# Patient Record
Sex: FEMALE | Race: WHITE | Employment: FULL TIME | URBAN - METROPOLITAN AREA
[De-identification: names, ages, dates, MRNs, and addresses within clinical notes are randomized per-mention and may not be internally consistent; named-entity substitution may affect disease eponyms.]

---

## 2017-11-14 ENCOUNTER — APPOINTMENT (OUTPATIENT)
Dept: CT IMAGING | Age: 47
DRG: 415 | End: 2017-11-14
Attending: EMERGENCY MEDICINE
Payer: SELF-PAY

## 2017-11-14 ENCOUNTER — ANESTHESIA EVENT (OUTPATIENT)
Dept: SURGERY | Age: 47
DRG: 415 | End: 2017-11-14
Payer: SELF-PAY

## 2017-11-14 ENCOUNTER — APPOINTMENT (OUTPATIENT)
Dept: ULTRASOUND IMAGING | Age: 47
DRG: 415 | End: 2017-11-14
Attending: EMERGENCY MEDICINE
Payer: SELF-PAY

## 2017-11-14 ENCOUNTER — HOSPITAL ENCOUNTER (INPATIENT)
Age: 47
LOS: 4 days | Discharge: HOME | DRG: 415 | End: 2017-11-19
Attending: EMERGENCY MEDICINE | Admitting: SURGERY
Payer: SELF-PAY

## 2017-11-14 ENCOUNTER — ANESTHESIA (OUTPATIENT)
Dept: SURGERY | Age: 47
DRG: 415 | End: 2017-11-14
Payer: SELF-PAY

## 2017-11-14 DIAGNOSIS — K81.0 ACUTE CHOLECYSTITIS: Primary | ICD-10-CM

## 2017-11-14 PROBLEM — K80.00 ACUTE CALCULOUS CHOLECYSTITIS: Status: ACTIVE | Noted: 2017-11-14

## 2017-11-14 LAB
ALBUMIN SERPL-MCNC: 3.8 G/DL (ref 3.5–5)
ALBUMIN/GLOB SERPL: 1 {RATIO} (ref 1.1–2.2)
ALP SERPL-CCNC: 94 U/L (ref 45–117)
ALT SERPL-CCNC: 26 U/L (ref 12–78)
ANION GAP SERPL CALC-SCNC: 9 MMOL/L (ref 5–15)
APPEARANCE UR: CLEAR
AST SERPL-CCNC: 11 U/L (ref 15–37)
BACTERIA URNS QL MICRO: NEGATIVE /HPF
BASOPHILS # BLD: 0 K/UL (ref 0–0.1)
BASOPHILS NFR BLD: 0 % (ref 0–1)
BILIRUB SERPL-MCNC: 0.3 MG/DL (ref 0.2–1)
BILIRUB UR QL: NEGATIVE
BUN SERPL-MCNC: 8 MG/DL (ref 6–20)
BUN/CREAT SERPL: 11 (ref 12–20)
CALCIUM SERPL-MCNC: 8.9 MG/DL (ref 8.5–10.1)
CHLORIDE SERPL-SCNC: 104 MMOL/L (ref 97–108)
CO2 SERPL-SCNC: 24 MMOL/L (ref 21–32)
COLOR UR: NORMAL
CREAT SERPL-MCNC: 0.7 MG/DL (ref 0.55–1.02)
EOSINOPHIL # BLD: 0.1 K/UL (ref 0–0.4)
EOSINOPHIL NFR BLD: 1 % (ref 0–7)
EPITH CASTS URNS QL MICRO: NORMAL /LPF
ERYTHROCYTE [DISTWIDTH] IN BLOOD BY AUTOMATED COUNT: 13.6 % (ref 11.5–14.5)
GLOBULIN SER CALC-MCNC: 4 G/DL (ref 2–4)
GLUCOSE SERPL-MCNC: 126 MG/DL (ref 65–100)
GLUCOSE UR STRIP.AUTO-MCNC: NEGATIVE MG/DL
HCT VFR BLD AUTO: 45.2 % (ref 35–47)
HGB BLD-MCNC: 15.5 G/DL (ref 11.5–16)
HGB UR QL STRIP: NEGATIVE
HYALINE CASTS URNS QL MICRO: NORMAL /LPF (ref 0–5)
KETONES UR QL STRIP.AUTO: NEGATIVE MG/DL
LEUKOCYTE ESTERASE UR QL STRIP.AUTO: NEGATIVE
LIPASE SERPL-CCNC: 101 U/L (ref 73–393)
LYMPHOCYTES # BLD: 1.6 K/UL (ref 0.8–3.5)
LYMPHOCYTES NFR BLD: 11 % (ref 12–49)
MCH RBC QN AUTO: 30.9 PG (ref 26–34)
MCHC RBC AUTO-ENTMCNC: 34.3 G/DL (ref 30–36.5)
MCV RBC AUTO: 90 FL (ref 80–99)
MONOCYTES # BLD: 0.8 K/UL (ref 0–1)
MONOCYTES NFR BLD: 6 % (ref 5–13)
NEUTS SEG # BLD: 11.3 K/UL (ref 1.8–8)
NEUTS SEG NFR BLD: 82 % (ref 32–75)
NITRITE UR QL STRIP.AUTO: NEGATIVE
PH UR STRIP: 6 [PH] (ref 5–8)
PLATELET # BLD AUTO: 221 K/UL (ref 150–400)
POTASSIUM SERPL-SCNC: 4 MMOL/L (ref 3.5–5.1)
PROT SERPL-MCNC: 7.8 G/DL (ref 6.4–8.2)
PROT UR STRIP-MCNC: NEGATIVE MG/DL
RBC # BLD AUTO: 5.02 M/UL (ref 3.8–5.2)
RBC #/AREA URNS HPF: NORMAL /HPF (ref 0–5)
SODIUM SERPL-SCNC: 137 MMOL/L (ref 136–145)
SP GR UR REFRACTOMETRY: 1.01 (ref 1–1.03)
UA: UC IF INDICATED,UAUC: NORMAL
UROBILINOGEN UR QL STRIP.AUTO: 0.2 EU/DL (ref 0.2–1)
WBC # BLD AUTO: 13.9 K/UL (ref 3.6–11)
WBC URNS QL MICRO: NORMAL /HPF (ref 0–4)

## 2017-11-14 PROCEDURE — 77030011640 HC PAD GRND REM COVD -A: Performed by: SURGERY

## 2017-11-14 PROCEDURE — 86900 BLOOD TYPING SEROLOGIC ABO: CPT | Performed by: SURGERY

## 2017-11-14 PROCEDURE — 77030008756 HC TU IRR SUC STRY -B: Performed by: SURGERY

## 2017-11-14 PROCEDURE — 30243N1 TRANSFUSION OF NONAUTOLOGOUS RED BLOOD CELLS INTO CENTRAL VEIN, PERCUTANEOUS APPROACH: ICD-10-PCS | Performed by: SURGERY

## 2017-11-14 PROCEDURE — 76210000006 HC OR PH I REC 0.5 TO 1 HR: Performed by: SURGERY

## 2017-11-14 PROCEDURE — 77030035048 HC TRCR ENDOSC OPTCL COVD -B: Performed by: SURGERY

## 2017-11-14 PROCEDURE — 85025 COMPLETE CBC W/AUTO DIFF WBC: CPT | Performed by: EMERGENCY MEDICINE

## 2017-11-14 PROCEDURE — 77030020153 HC PRB DOPLR DISP MIZU -C: Performed by: SURGERY

## 2017-11-14 PROCEDURE — 76060000040 HC ANESTHESIA 4.5 TO 5 HR: Performed by: SURGERY

## 2017-11-14 PROCEDURE — 77030034698 HC LIGASURE MRYLND OPN SEAL DIV COVD -F: Performed by: SURGERY

## 2017-11-14 PROCEDURE — 77030018702 HC CLP LIG TI TELE -A: Performed by: SURGERY

## 2017-11-14 PROCEDURE — 77030002986 HC SUT PROL J&J -A: Performed by: SURGERY

## 2017-11-14 PROCEDURE — 77030009852 HC PCH RTVR ENDOSC COVD -B: Performed by: SURGERY

## 2017-11-14 PROCEDURE — 86920 COMPATIBILITY TEST SPIN: CPT | Performed by: SURGERY

## 2017-11-14 PROCEDURE — 77030002996 HC SUT SLK J&J -A: Performed by: SURGERY

## 2017-11-14 PROCEDURE — 77030010516 HC APPL HEMA CLP TELE -B: Performed by: SURGERY

## 2017-11-14 PROCEDURE — 77030008684 HC TU ET CUF COVD -B: Performed by: ANESTHESIOLOGY

## 2017-11-14 PROCEDURE — 77030035051: Performed by: SURGERY

## 2017-11-14 PROCEDURE — 74011636320 HC RX REV CODE- 636/320: Performed by: EMERGENCY MEDICINE

## 2017-11-14 PROCEDURE — 74011000258 HC RX REV CODE- 258

## 2017-11-14 PROCEDURE — P9045 ALBUMIN (HUMAN), 5%, 250 ML: HCPCS

## 2017-11-14 PROCEDURE — 76010000136 HC OR TIME 4.5 TO 5 HR: Performed by: SURGERY

## 2017-11-14 PROCEDURE — 83690 ASSAY OF LIPASE: CPT | Performed by: EMERGENCY MEDICINE

## 2017-11-14 PROCEDURE — 77030015933 HC FIBRIJET DUPLO BAXT -B: Performed by: SURGERY

## 2017-11-14 PROCEDURE — 74011250636 HC RX REV CODE- 250/636: Performed by: SURGERY

## 2017-11-14 PROCEDURE — 74011250636 HC RX REV CODE- 250/636: Performed by: EMERGENCY MEDICINE

## 2017-11-14 PROCEDURE — 77030019908 HC STETH ESOPH SIMS -A: Performed by: ANESTHESIOLOGY

## 2017-11-14 PROCEDURE — 76705 ECHO EXAM OF ABDOMEN: CPT

## 2017-11-14 PROCEDURE — 96366 THER/PROPH/DIAG IV INF ADDON: CPT

## 2017-11-14 PROCEDURE — 77030031139 HC SUT VCRL2 J&J -A: Performed by: SURGERY

## 2017-11-14 PROCEDURE — 77030002933 HC SUT MCRYL J&J -A: Performed by: SURGERY

## 2017-11-14 PROCEDURE — 77030013533 HC SEAL FBRN TISSL RDYTUSE 10ML BAXT -E: Performed by: SURGERY

## 2017-11-14 PROCEDURE — 74011250636 HC RX REV CODE- 250/636

## 2017-11-14 PROCEDURE — 0FT40ZZ RESECTION OF GALLBLADDER, OPEN APPROACH: ICD-10-PCS | Performed by: SURGERY

## 2017-11-14 PROCEDURE — 77030014650 HC SEAL MTRX FLOSEL BAXT -C: Performed by: SURGERY

## 2017-11-14 PROCEDURE — 99284 EMERGENCY DEPT VISIT MOD MDM: CPT

## 2017-11-14 PROCEDURE — 96375 TX/PRO/DX INJ NEW DRUG ADDON: CPT

## 2017-11-14 PROCEDURE — 77030011266 HC ELECTRD BLD INSL COVD -A: Performed by: SURGERY

## 2017-11-14 PROCEDURE — 77030020053 HC ELECTRD LAPSCP COVD -B: Performed by: SURGERY

## 2017-11-14 PROCEDURE — 77030035220 HC TRCR ENDOSC BLNTPRT ANCHR COVD -B: Performed by: SURGERY

## 2017-11-14 PROCEDURE — 96361 HYDRATE IV INFUSION ADD-ON: CPT

## 2017-11-14 PROCEDURE — 96376 TX/PRO/DX INJ SAME DRUG ADON: CPT

## 2017-11-14 PROCEDURE — 77030008771 HC TU NG SALEM SUMP -A: Performed by: ANESTHESIOLOGY

## 2017-11-14 PROCEDURE — 36415 COLL VENOUS BLD VENIPUNCTURE: CPT | Performed by: EMERGENCY MEDICINE

## 2017-11-14 PROCEDURE — 77030010507 HC ADH SKN DERMBND J&J -B: Performed by: SURGERY

## 2017-11-14 PROCEDURE — 77030008467 HC STPLR SKN COVD -B: Performed by: SURGERY

## 2017-11-14 PROCEDURE — 77030018836 HC SOL IRR NACL ICUM -A: Performed by: SURGERY

## 2017-11-14 PROCEDURE — 0F944ZZ DRAINAGE OF GALLBLADDER, PERCUTANEOUS ENDOSCOPIC APPROACH: ICD-10-PCS | Performed by: SURGERY

## 2017-11-14 PROCEDURE — 77030026438 HC STYL ET INTUB CARD -A: Performed by: ANESTHESIOLOGY

## 2017-11-14 PROCEDURE — 80053 COMPREHEN METABOLIC PANEL: CPT | Performed by: EMERGENCY MEDICINE

## 2017-11-14 PROCEDURE — 77030032490 HC SLV COMPR SCD KNE COVD -B: Performed by: SURGERY

## 2017-11-14 PROCEDURE — 74177 CT ABD & PELVIS W/CONTRAST: CPT

## 2017-11-14 PROCEDURE — 74011000250 HC RX REV CODE- 250

## 2017-11-14 PROCEDURE — 77030002966 HC SUT PDS J&J -A: Performed by: SURGERY

## 2017-11-14 PROCEDURE — 96365 THER/PROPH/DIAG IV INF INIT: CPT

## 2017-11-14 PROCEDURE — 04QY0ZZ REPAIR LOWER ARTERY, OPEN APPROACH: ICD-10-PCS | Performed by: SURGERY

## 2017-11-14 PROCEDURE — 81001 URINALYSIS AUTO W/SCOPE: CPT | Performed by: EMERGENCY MEDICINE

## 2017-11-14 PROCEDURE — 77030018390 HC SPNG HEMSTAT2 J&J -B: Performed by: SURGERY

## 2017-11-14 PROCEDURE — 85018 HEMOGLOBIN: CPT

## 2017-11-14 PROCEDURE — 74011000258 HC RX REV CODE- 258: Performed by: SURGERY

## 2017-11-14 PROCEDURE — 77030012022 HC APPL CLP ENDOSC COVD -C: Performed by: SURGERY

## 2017-11-14 PROCEDURE — P9016 RBC LEUKOCYTES REDUCED: HCPCS | Performed by: SURGERY

## 2017-11-14 RX ORDER — LIDOCAINE HYDROCHLORIDE 20 MG/ML
INJECTION, SOLUTION EPIDURAL; INFILTRATION; INTRACAUDAL; PERINEURAL AS NEEDED
Status: DISCONTINUED | OUTPATIENT
Start: 2017-11-14 | End: 2017-11-15 | Stop reason: HOSPADM

## 2017-11-14 RX ORDER — SODIUM CHLORIDE, SODIUM LACTATE, POTASSIUM CHLORIDE, CALCIUM CHLORIDE 600; 310; 30; 20 MG/100ML; MG/100ML; MG/100ML; MG/100ML
100 INJECTION, SOLUTION INTRAVENOUS CONTINUOUS
Status: DISCONTINUED | OUTPATIENT
Start: 2017-11-14 | End: 2017-11-15

## 2017-11-14 RX ORDER — MIDAZOLAM HYDROCHLORIDE 1 MG/ML
INJECTION, SOLUTION INTRAMUSCULAR; INTRAVENOUS AS NEEDED
Status: DISCONTINUED | OUTPATIENT
Start: 2017-11-14 | End: 2017-11-15 | Stop reason: HOSPADM

## 2017-11-14 RX ORDER — HYDROMORPHONE HYDROCHLORIDE 1 MG/ML
1 INJECTION, SOLUTION INTRAMUSCULAR; INTRAVENOUS; SUBCUTANEOUS
Status: COMPLETED | OUTPATIENT
Start: 2017-11-14 | End: 2017-11-14

## 2017-11-14 RX ORDER — SODIUM CHLORIDE 9 MG/ML
125 INJECTION, SOLUTION INTRAVENOUS CONTINUOUS
Status: DISCONTINUED | OUTPATIENT
Start: 2017-11-14 | End: 2017-11-15

## 2017-11-14 RX ORDER — SODIUM CHLORIDE 9 MG/ML
INJECTION, SOLUTION INTRAVENOUS
Status: DISCONTINUED | OUTPATIENT
Start: 2017-11-14 | End: 2017-11-15 | Stop reason: HOSPADM

## 2017-11-14 RX ORDER — ROCURONIUM BROMIDE 10 MG/ML
INJECTION, SOLUTION INTRAVENOUS AS NEEDED
Status: DISCONTINUED | OUTPATIENT
Start: 2017-11-14 | End: 2017-11-15 | Stop reason: HOSPADM

## 2017-11-14 RX ORDER — DEXAMETHASONE SODIUM PHOSPHATE 4 MG/ML
INJECTION, SOLUTION INTRA-ARTICULAR; INTRALESIONAL; INTRAMUSCULAR; INTRAVENOUS; SOFT TISSUE AS NEEDED
Status: DISCONTINUED | OUTPATIENT
Start: 2017-11-14 | End: 2017-11-15 | Stop reason: HOSPADM

## 2017-11-14 RX ORDER — SODIUM CHLORIDE 9 MG/ML
250 INJECTION, SOLUTION INTRAVENOUS AS NEEDED
Status: DISCONTINUED | OUTPATIENT
Start: 2017-11-14 | End: 2017-11-15 | Stop reason: ALTCHOICE

## 2017-11-14 RX ORDER — LIDOCAINE HYDROCHLORIDE 10 MG/ML
0.1 INJECTION, SOLUTION EPIDURAL; INFILTRATION; INTRACAUDAL; PERINEURAL AS NEEDED
Status: DISCONTINUED | OUTPATIENT
Start: 2017-11-14 | End: 2017-11-15

## 2017-11-14 RX ORDER — SODIUM CHLORIDE 9 MG/ML
50 INJECTION, SOLUTION INTRAVENOUS
Status: COMPLETED | OUTPATIENT
Start: 2017-11-14 | End: 2017-11-17

## 2017-11-14 RX ORDER — MORPHINE SULFATE 2 MG/ML
INJECTION, SOLUTION INTRAMUSCULAR; INTRAVENOUS
Status: DISPENSED
Start: 2017-11-14 | End: 2017-11-14

## 2017-11-14 RX ORDER — SODIUM CHLORIDE, SODIUM LACTATE, POTASSIUM CHLORIDE, CALCIUM CHLORIDE 600; 310; 30; 20 MG/100ML; MG/100ML; MG/100ML; MG/100ML
INJECTION, SOLUTION INTRAVENOUS
Status: DISCONTINUED | OUTPATIENT
Start: 2017-11-14 | End: 2017-11-15 | Stop reason: HOSPADM

## 2017-11-14 RX ORDER — ACETAMINOPHEN 10 MG/ML
INJECTION, SOLUTION INTRAVENOUS AS NEEDED
Status: DISCONTINUED | OUTPATIENT
Start: 2017-11-14 | End: 2017-11-15 | Stop reason: HOSPADM

## 2017-11-14 RX ORDER — ONDANSETRON 2 MG/ML
4 INJECTION INTRAMUSCULAR; INTRAVENOUS
Status: COMPLETED | OUTPATIENT
Start: 2017-11-14 | End: 2017-11-14

## 2017-11-14 RX ORDER — MORPHINE SULFATE 2 MG/ML
4 INJECTION, SOLUTION INTRAMUSCULAR; INTRAVENOUS
Status: COMPLETED | OUTPATIENT
Start: 2017-11-14 | End: 2017-11-14

## 2017-11-14 RX ORDER — MORPHINE SULFATE 2 MG/ML
6 INJECTION, SOLUTION INTRAMUSCULAR; INTRAVENOUS
Status: COMPLETED | OUTPATIENT
Start: 2017-11-14 | End: 2017-11-14

## 2017-11-14 RX ORDER — SUCCINYLCHOLINE CHLORIDE 20 MG/ML
INJECTION INTRAMUSCULAR; INTRAVENOUS AS NEEDED
Status: DISCONTINUED | OUTPATIENT
Start: 2017-11-14 | End: 2017-11-15 | Stop reason: HOSPADM

## 2017-11-14 RX ORDER — MIDAZOLAM HYDROCHLORIDE 1 MG/ML
1 INJECTION, SOLUTION INTRAMUSCULAR; INTRAVENOUS AS NEEDED
Status: DISCONTINUED | OUTPATIENT
Start: 2017-11-14 | End: 2017-11-15

## 2017-11-14 RX ORDER — BISMUTH SUBSALICYLATE 262 MG/15ML
LIQUID ORAL
COMMUNITY
End: 2021-07-20

## 2017-11-14 RX ORDER — LITHIUM CARBONATE 600 MG/1
600 CAPSULE ORAL
COMMUNITY

## 2017-11-14 RX ORDER — FENTANYL CITRATE 50 UG/ML
50 INJECTION, SOLUTION INTRAMUSCULAR; INTRAVENOUS AS NEEDED
Status: DISCONTINUED | OUTPATIENT
Start: 2017-11-14 | End: 2017-11-15

## 2017-11-14 RX ORDER — PROPOFOL 10 MG/ML
INJECTION, EMULSION INTRAVENOUS AS NEEDED
Status: DISCONTINUED | OUTPATIENT
Start: 2017-11-14 | End: 2017-11-15 | Stop reason: HOSPADM

## 2017-11-14 RX ORDER — SODIUM CHLORIDE 0.9 % (FLUSH) 0.9 %
10 SYRINGE (ML) INJECTION
Status: COMPLETED | OUTPATIENT
Start: 2017-11-14 | End: 2017-11-14

## 2017-11-14 RX ORDER — FENTANYL CITRATE 50 UG/ML
INJECTION, SOLUTION INTRAMUSCULAR; INTRAVENOUS AS NEEDED
Status: DISCONTINUED | OUTPATIENT
Start: 2017-11-14 | End: 2017-11-15 | Stop reason: HOSPADM

## 2017-11-14 RX ORDER — PHENYLEPHRINE HCL IN 0.9% NACL 0.4MG/10ML
SYRINGE (ML) INTRAVENOUS AS NEEDED
Status: DISCONTINUED | OUTPATIENT
Start: 2017-11-14 | End: 2017-11-15 | Stop reason: HOSPADM

## 2017-11-14 RX ORDER — ALBUMIN HUMAN 50 G/1000ML
SOLUTION INTRAVENOUS AS NEEDED
Status: DISCONTINUED | OUTPATIENT
Start: 2017-11-14 | End: 2017-11-15 | Stop reason: HOSPADM

## 2017-11-14 RX ADMIN — SODIUM CHLORIDE, SODIUM LACTATE, POTASSIUM CHLORIDE, CALCIUM CHLORIDE: 600; 310; 30; 20 INJECTION, SOLUTION INTRAVENOUS at 21:00

## 2017-11-14 RX ADMIN — Medication 120 MCG: at 21:21

## 2017-11-14 RX ADMIN — MORPHINE SULFATE 6 MG: 2 INJECTION, SOLUTION INTRAMUSCULAR; INTRAVENOUS at 08:57

## 2017-11-14 RX ADMIN — Medication 80 MCG: at 21:18

## 2017-11-14 RX ADMIN — ALBUMIN HUMAN 250 ML: 50 SOLUTION INTRAVENOUS at 21:53

## 2017-11-14 RX ADMIN — MIDAZOLAM HYDROCHLORIDE 2 MG: 1 INJECTION, SOLUTION INTRAMUSCULAR; INTRAVENOUS at 20:28

## 2017-11-14 RX ADMIN — HYDROMORPHONE HYDROCHLORIDE 1 MG: 1 INJECTION, SOLUTION INTRAMUSCULAR; INTRAVENOUS; SUBCUTANEOUS at 18:50

## 2017-11-14 RX ADMIN — HYDROMORPHONE HYDROCHLORIDE 1 MG: 1 INJECTION, SOLUTION INTRAMUSCULAR; INTRAVENOUS; SUBCUTANEOUS at 13:58

## 2017-11-14 RX ADMIN — Medication 80 MCG: at 20:51

## 2017-11-14 RX ADMIN — SODIUM CHLORIDE 50 ML/HR: 900 INJECTION, SOLUTION INTRAVENOUS at 09:26

## 2017-11-14 RX ADMIN — SODIUM CHLORIDE, SODIUM LACTATE, POTASSIUM CHLORIDE, CALCIUM CHLORIDE: 600; 310; 30; 20 INJECTION, SOLUTION INTRAVENOUS at 21:15

## 2017-11-14 RX ADMIN — HYDROMORPHONE HYDROCHLORIDE 1 MG: 1 INJECTION, SOLUTION INTRAMUSCULAR; INTRAVENOUS; SUBCUTANEOUS at 16:06

## 2017-11-14 RX ADMIN — MORPHINE SULFATE 4 MG: 2 INJECTION, SOLUTION INTRAMUSCULAR; INTRAVENOUS at 08:07

## 2017-11-14 RX ADMIN — HYDROMORPHONE HYDROCHLORIDE 1 MG: 1 INJECTION, SOLUTION INTRAMUSCULAR; INTRAVENOUS; SUBCUTANEOUS at 10:57

## 2017-11-14 RX ADMIN — SODIUM CHLORIDE, SODIUM LACTATE, POTASSIUM CHLORIDE, CALCIUM CHLORIDE: 600; 310; 30; 20 INJECTION, SOLUTION INTRAVENOUS at 22:55

## 2017-11-14 RX ADMIN — SODIUM CHLORIDE: 9 INJECTION, SOLUTION INTRAVENOUS at 23:15

## 2017-11-14 RX ADMIN — ALBUMIN HUMAN 250 ML: 50 SOLUTION INTRAVENOUS at 21:40

## 2017-11-14 RX ADMIN — ONDANSETRON 4 MG: 2 INJECTION INTRAMUSCULAR; INTRAVENOUS at 08:06

## 2017-11-14 RX ADMIN — FENTANYL CITRATE 150 MCG: 50 INJECTION, SOLUTION INTRAMUSCULAR; INTRAVENOUS at 20:35

## 2017-11-14 RX ADMIN — IOPAMIDOL 100 ML: 755 INJECTION, SOLUTION INTRAVENOUS at 09:26

## 2017-11-14 RX ADMIN — ROCURONIUM BROMIDE 10 MG: 10 INJECTION, SOLUTION INTRAVENOUS at 22:13

## 2017-11-14 RX ADMIN — SODIUM CHLORIDE 1000 ML: 900 INJECTION, SOLUTION INTRAVENOUS at 08:15

## 2017-11-14 RX ADMIN — Medication 40 MCG: at 20:47

## 2017-11-14 RX ADMIN — ALBUMIN HUMAN 250 ML: 50 SOLUTION INTRAVENOUS at 23:50

## 2017-11-14 RX ADMIN — SODIUM CHLORIDE: 9 INJECTION, SOLUTION INTRAVENOUS at 21:55

## 2017-11-14 RX ADMIN — PIPERACILLIN SODIUM,TAZOBACTAM SODIUM 3.38 G: 3; .375 INJECTION, POWDER, FOR SOLUTION INTRAVENOUS at 23:00

## 2017-11-14 RX ADMIN — ALBUMIN HUMAN 250 ML: 50 SOLUTION INTRAVENOUS at 21:25

## 2017-11-14 RX ADMIN — PROPOFOL 150 MG: 10 INJECTION, EMULSION INTRAVENOUS at 20:35

## 2017-11-14 RX ADMIN — ROCURONIUM BROMIDE 10 MG: 10 INJECTION, SOLUTION INTRAVENOUS at 20:35

## 2017-11-14 RX ADMIN — LIDOCAINE HYDROCHLORIDE 80 MG: 20 INJECTION, SOLUTION EPIDURAL; INFILTRATION; INTRACAUDAL; PERINEURAL at 20:35

## 2017-11-14 RX ADMIN — Medication 10 ML: at 09:26

## 2017-11-14 RX ADMIN — ROCURONIUM BROMIDE 10 MG: 10 INJECTION, SOLUTION INTRAVENOUS at 21:15

## 2017-11-14 RX ADMIN — SODIUM CHLORIDE 125 ML/HR: 900 INJECTION, SOLUTION INTRAVENOUS at 14:09

## 2017-11-14 RX ADMIN — Medication 120 MCG: at 21:19

## 2017-11-14 RX ADMIN — SUCCINYLCHOLINE CHLORIDE 120 MG: 20 INJECTION INTRAMUSCULAR; INTRAVENOUS at 20:35

## 2017-11-14 RX ADMIN — ROCURONIUM BROMIDE 10 MG: 10 INJECTION, SOLUTION INTRAVENOUS at 23:30

## 2017-11-14 RX ADMIN — ACETAMINOPHEN 1000 MG: 10 INJECTION, SOLUTION INTRAVENOUS at 20:45

## 2017-11-14 RX ADMIN — SODIUM CHLORIDE, SODIUM LACTATE, POTASSIUM CHLORIDE, CALCIUM CHLORIDE: 600; 310; 30; 20 INJECTION, SOLUTION INTRAVENOUS at 20:28

## 2017-11-14 RX ADMIN — ROCURONIUM BROMIDE 20 MG: 10 INJECTION, SOLUTION INTRAVENOUS at 20:43

## 2017-11-14 RX ADMIN — PIPERACILLIN SODIUM,TAZOBACTAM SODIUM 3.38 G: 3; .375 INJECTION, POWDER, FOR SOLUTION INTRAVENOUS at 16:06

## 2017-11-14 RX ADMIN — DEXAMETHASONE SODIUM PHOSPHATE 10 MG: 4 INJECTION, SOLUTION INTRA-ARTICULAR; INTRALESIONAL; INTRAMUSCULAR; INTRAVENOUS; SOFT TISSUE at 20:45

## 2017-11-14 RX ADMIN — ROCURONIUM BROMIDE 10 MG: 10 INJECTION, SOLUTION INTRAVENOUS at 22:35

## 2017-11-14 NOTE — ED NOTES
Pt yelling out and rocking back and forth on stretcher in pain. Dr Jenna Nixon aware via scribe. Pt and family aware that this RN spoke with MD and awaiting medication orders.

## 2017-11-14 NOTE — H&P
Surgery History and Physcial    Subjective:      Sherren Craven is a 56 y.o. female who presents for evaluation of abdominal pain. The pain is located in the RUQ with radiation to the right back. Pain is described as sharp and stabbing and measures 9/10 in intensity. Onset of pain was 3 days ago. Aggravating factors include movement and eating. Alleviating factors include none. Associated symptoms include anorexia, nausea and vomiting. Patient Active Problem List    Diagnosis Date Noted    Acute calculous cholecystitis 11/14/2017    Female pelvic pain 11/13/2014    Dysmenorrhea 11/13/2014     Past Medical History:   Diagnosis Date    Psychiatric disorder     bipolar      Past Surgical History:   Procedure Laterality Date    HX GYN      hysteroscope    HX PARTIAL HYSTERECTOMY        Social History   Substance Use Topics    Smoking status: Current Every Day Smoker     Packs/day: 1.00     Years: 19.00    Smokeless tobacco: Never Used    Alcohol use Yes      Comment: socially      Family History   Problem Relation Age of Onset    Cancer Father      colon    Heart Disease Father      MI    Anesth Problems Neg Hx       Prior to Admission medications    Medication Sig Start Date End Date Taking? Authorizing Provider   lithium carbonate 600 mg capsule Take 600 mg by mouth daily. Yes Phys Jarrett, MD   dextroamphetamine-amphetamine (ADDERALL) 20 mg tablet Take 20 mg by mouth two (2) times a day. Yes Historical Provider   zolpidem (AMBIEN) 5 mg tablet Take  by mouth nightly as needed for Sleep. Yes Historical Provider   Cetirizine (ZYRTEC) 10 mg cap Take  by mouth nightly. Yes Historical Provider   lansoprazole (PREVACID) 30 mg capsule Take  by mouth nightly. Yes Historical Provider   rOPINIRole (REQUIP) 1 mg tablet Take 1 mg by mouth nightly.    Yes Historical Provider     Allergies   Allergen Reactions    Wellbutrin [Bupropion] Hives         Review of Systems   Constitutional: Positive for appetite change. Negative for chills, diaphoresis and fever. Respiratory: Negative for shortness of breath and wheezing. Cardiovascular: Negative for chest pain and palpitations. Gastrointestinal: Positive for abdominal pain, nausea and vomiting. Negative for diarrhea. Musculoskeletal: Positive for back pain. Negative for myalgias. Hematological: Does not bruise/bleed easily. Objective:     Visit Vitals    /64 (BP 1 Location: Left arm, BP Patient Position: At rest)    Temp 97.7 °F (36.5 °C)    Resp 16    Ht 5' 5\" (1.651 m)    Wt 155 lb 13.8 oz (70.7 kg)    SpO2 95%    BMI 25.94 kg/m2       Physical Exam   Constitutional: She appears well-developed and well-nourished. No distress. HENT:   Head: Normocephalic and atraumatic. Cardiovascular: Normal rate, regular rhythm, normal heart sounds and intact distal pulses. Pulmonary/Chest: Breath sounds normal. She has no wheezes. She has no rales. Abdominal: Soft. Bowel sounds are normal. She exhibits no distension and no mass. There is no hepatosplenomegaly. There is tenderness in the right upper quadrant. There is positive Pennington's sign. There is no rigidity, no rebound, no guarding and no tenderness at McBurney's point. No hernia. Musculoskeletal: Normal range of motion. Lymphadenopathy:     She has no cervical adenopathy.        Imaging:  images and reports reviewed    Lab Review:    Recent Results (from the past 24 hour(s))   METABOLIC PANEL, COMPREHENSIVE    Collection Time: 11/14/17  7:59 AM   Result Value Ref Range    Sodium 137 136 - 145 mmol/L    Potassium 4.0 3.5 - 5.1 mmol/L    Chloride 104 97 - 108 mmol/L    CO2 24 21 - 32 mmol/L    Anion gap 9 5 - 15 mmol/L    Glucose 126 (H) 65 - 100 mg/dL    BUN 8 6 - 20 MG/DL    Creatinine 0.70 0.55 - 1.02 MG/DL    BUN/Creatinine ratio 11 (L) 12 - 20      GFR est AA >60 >60 ml/min/1.73m2    GFR est non-AA >60 >60 ml/min/1.73m2    Calcium 8.9 8.5 - 10.1 MG/DL    Bilirubin, total 0.3 0.2 - 1.0 MG/DL    ALT (SGPT) 26 12 - 78 U/L    AST (SGOT) 11 (L) 15 - 37 U/L    Alk. phosphatase 94 45 - 117 U/L    Protein, total 7.8 6.4 - 8.2 g/dL    Albumin 3.8 3.5 - 5.0 g/dL    Globulin 4.0 2.0 - 4.0 g/dL    A-G Ratio 1.0 (L) 1.1 - 2.2     CBC WITH AUTOMATED DIFF    Collection Time: 11/14/17  7:59 AM   Result Value Ref Range    WBC 13.9 (H) 3.6 - 11.0 K/uL    RBC 5.02 3.80 - 5.20 M/uL    HGB 15.5 11.5 - 16.0 g/dL    HCT 45.2 35.0 - 47.0 %    MCV 90.0 80.0 - 99.0 FL    MCH 30.9 26.0 - 34.0 PG    MCHC 34.3 30.0 - 36.5 g/dL    RDW 13.6 11.5 - 14.5 %    PLATELET 670 688 - 958 K/uL    NEUTROPHILS 82 (H) 32 - 75 %    LYMPHOCYTES 11 (L) 12 - 49 %    MONOCYTES 6 5 - 13 %    EOSINOPHILS 1 0 - 7 %    BASOPHILS 0 0 - 1 %    ABS. NEUTROPHILS 11.3 (H) 1.8 - 8.0 K/UL    ABS. LYMPHOCYTES 1.6 0.8 - 3.5 K/UL    ABS. MONOCYTES 0.8 0.0 - 1.0 K/UL    ABS. EOSINOPHILS 0.1 0.0 - 0.4 K/UL    ABS. BASOPHILS 0.0 0.0 - 0.1 K/UL   LIPASE    Collection Time: 11/14/17  7:59 AM   Result Value Ref Range    Lipase 101 73 - 393 U/L   URINALYSIS W/ REFLEX CULTURE    Collection Time: 11/14/17  9:40 AM   Result Value Ref Range    Color YELLOW/STRAW      Appearance CLEAR CLEAR      Specific gravity 1.015 1.003 - 1.030      pH (UA) 6.0 5.0 - 8.0      Protein NEGATIVE  NEG mg/dL    Glucose NEGATIVE  NEG mg/dL    Ketone NEGATIVE  NEG mg/dL    Bilirubin NEGATIVE  NEG      Blood NEGATIVE  NEG      Urobilinogen 0.2 0.2 - 1.0 EU/dL    Nitrites NEGATIVE  NEG      Leukocyte Esterase NEGATIVE  NEG      WBC 0-4 0 - 4 /hpf    RBC 0-5 0 - 5 /hpf    Epithelial cells FEW FEW /lpf    Bacteria NEGATIVE  NEG /hpf    UA:UC IF INDICATED CULTURE NOT INDICATED BY UA RESULT CNI      Hyaline cast 0-2 0 - 5 /lpf         Assessment:     Abdominal pain, suspect acute calculous cholecystitis with impacted large stone in gallbladder neck, pericholecystic fluid and radiographic Pennington's sign. Plan:     1.  I recommend proceeding with Surgery:  Cholecystectomy and Laparoscopy. 2. Discussed aspects of surgical intervention, methods, risks including by not limited to infection, bleeding, hematoma, and perforation of the intestines or solid organs, conversion to open operation, injury to common bile duct, and the risks of general anesthetic. The patient understands the risks; any and all questions were answered to the patient's satisfaction.     Signed By: Clover Arteaga MD, [facility], [facility]    November 14, 2017

## 2017-11-14 NOTE — ED NOTES
Pt medicated as ordered with 1 mg IV Dilaudid. Pt updated on plan with pending evaluation by General Surgeon Pt immediately tearful stating \"I don't have insurance. \" Emotional support provided Pt family and friends remain bedside.

## 2017-11-14 NOTE — ED NOTES
Bedside shift change report given to Tuan Luis RN (oncoming nurse) by Joycelyn Paul RN (offgoing nurse). Report included the following information SBAR.

## 2017-11-14 NOTE — ED NOTES
Pt back from CT Pt ambulated to restroom to provide urine sample Pt back onto stretcher writhing back and forth complaining of pain. Pt asleep at time of transfer to CT department.      Dr Asiya Childs contacted via scribe to contact RN

## 2017-11-14 NOTE — PROGRESS NOTES
Piperacillin-tazobactam 3.375 gm changed to every 8 hours per extended infusion protocol.     LINCOLN FuentesD

## 2017-11-14 NOTE — ED NOTES
Pt medicated as ordered for pain/nausea. Pt  bedside Pt instructed on nice slow deep breathing to aid in her pain.

## 2017-11-14 NOTE — ED PROVIDER NOTES
[facility].  EMERGENCY DEPARTMENT HISTORY AND PHYSICAL EXAM       Date of Service: 11/14/2017   Patient Name: Robby Kelsey   YOB: 1970  Medical Record Number: [id number]    History of Presenting Illness     Chief Complaint   Patient presents with    Abdominal Pain     pt c/o epigastric pain that radiates to bilateral lower abdomen with vomiting since Friday.  Vomiting        History Provided By:  patient    Additional History:   Robby Kelsey is a 56 y.o. female with PMhx significant for bipolar disorder, partial hysterectomy who presents ambulatory to the ED with cc of epigastric abdominal tenderness that radiates downwards to her bilateral lower abdominal areas, present x 5 days. She denies any hx of similar episodes of abdominal pain. Pt reports associated sx of nausea, vomiting, chills, and diarrhea. Today, she reports waking up at 2AM with \"excrutiating\" pain in her epigastric abdominal area. She reports a partial hysterectomy 2 years ago and denies any other abdominal surgical hx. She denies alcohol use, ASA use, Ibuprofen use. She denies chance of pregnancy. Pt denies appetite changes and states she was able to tolerate PO food and liquids yesterday. She specifically denies malodorous urine, hematuria, blood in stool, frequency, dysuria, fever. Social Hx: + Tobacco, + EtOH, - Illicit Drugs    There are no other complaints, changes or physical findings at this time.     Primary Care Provider: Lakeisha Winston MD   Specialist: none    Past History     Past Medical History:   Past Medical History:   Diagnosis Date    Psychiatric disorder     bipolar        Past Surgical History:   Past Surgical History:   Procedure Laterality Date    HX GYN      hysteroscope    HX PARTIAL HYSTERECTOMY          Family History:   Family History   Problem Relation Age of Onset    Cancer Father      colon    Heart Disease Father      [facility] Anesth Problems Neg Hx         Social History:   Social History   Substance Use Topics    Smoking status: Current Every Day Smoker     Packs/day: 1.00     Years: 19.00    Smokeless tobacco: Never Used    Alcohol use Yes      Comment: socially        Allergies: Allergies   Allergen Reactions    Wellbutrin [Bupropion] Hives         Review of Systems   Review of Systems   Constitutional: Positive for chills. Negative for fatigue and fever. HENT: Negative. Eyes: Negative. Respiratory: Negative for shortness of breath and wheezing. Cardiovascular: Negative for chest pain and leg swelling. Gastrointestinal: Positive for abdominal pain, diarrhea, nausea and vomiting. Negative for blood in stool and constipation. Endocrine: Negative. Genitourinary: Negative for difficulty urinating, dysuria, frequency and hematuria. Musculoskeletal: Negative. Skin: Negative for rash. Allergic/Immunologic: Negative. Neurological: Negative for weakness and numbness. Hematological: Negative. Psychiatric/Behavioral: Negative. Physical Exam  Physical Exam   Constitutional: She is oriented to person, place, and time. She appears well-developed and well-nourished.   + Appears uncomfortable   HENT:   Head: Normocephalic and atraumatic. Mouth/Throat: Mucous membranes are normal.   Eyes: EOM are normal. Pupils are equal, round, and reactive to light. Neck: Normal range of motion. No JVD present. No tracheal deviation present. Cardiovascular: Normal rate, regular rhythm, normal heart sounds and intact distal pulses. Exam reveals no gallop and no friction rub. No murmur heard. Pulmonary/Chest: Effort normal and breath sounds normal. No stridor. She has no wheezes. She has no rales. Abdominal: Soft. Bowel sounds are normal. She exhibits no distension and no mass.  There is no rebound and no guarding.   + Generalized abdominal tenderness, worse in the epigastric area  No guarding or rebound noted  Negative Pennington's sign   Musculoskeletal: Normal range of motion. She exhibits no edema or tenderness. Neurological: She is alert and oriented to person, place, and time. Skin: Skin is warm and dry. No rash noted. Psychiatric: She has a normal mood and affect. Her behavior is normal. Judgment and thought content normal.         Medical Decision Making   I am the first provider for this patient. I reviewed the vital signs, available nursing notes, past medical history, past surgical history, family history and social history. Old Medical Records: Old medical records. Provider Notes:   DDx: gastritis, gastroenteritis, PUD, pancreatitis, biliary colic, cholecystitis, GERD, diverticulitis, uti. ED Course:  7:55 AM   Initial assessment performed. The patients presenting problems have been discussed, and they are in agreement with the care plan formulated and outlined with them. I have encouraged them to ask questions as they arise throughout their visit. Progress Notes:   10:34 AM  Updated patient on lab results, informed her we are waiting on imaging results. She is still in a lot of pain, currently in childs pose on the bed. Will order more pain medication. Written by Grisel Abbott ED Scribseverino, as dictated by Stacey Hernandez DO    Consult Note:  10:48 AM  Stacey Hernandez DO spoke with Dr Leola Babinski,  Specialty: general surgery  Discussed pt's hx, disposition, and available diagnostic and imaging results. Reviewed care plans. Consultant agrees with plans as outlined. He states he cannot operate on her today but will come see her and to get her admitted for pain control. 11:16 AM  Dr Leola Babinski evaluated the patient. Patient informed him that she does not have insurance and is hesitant. He states to get an US to observe anatomical structures and evaluate cholecystitis.  If US is fine and her pain is controlled, then she can be discharged with pain medication (he recommends Levsin) and immediate f/u for outpatient surgery. If pain is not controllable with ED medications then she may need to be admitted today. [facility] Scribe, as dictated by Marlen Kee, DO    Progress Note:  2:03 PM  After reviewing US results, Dr Surya Corrigan will admit the patient. Diagnostic Study Results   Labs -  Recent Results (from the past 12 hour(s))   METABOLIC PANEL, COMPREHENSIVE    Collection Time: 11/14/17  7:59 AM   Result Value Ref Range    Sodium 137 136 - 145 mmol/L    Potassium 4.0 3.5 - 5.1 mmol/L    Chloride 104 97 - 108 mmol/L    CO2 24 21 - 32 mmol/L    Anion gap 9 5 - 15 mmol/L    Glucose 126 (H) 65 - 100 mg/dL    BUN 8 6 - 20 MG/DL    Creatinine 0.70 0.55 - 1.02 MG/DL    BUN/Creatinine ratio 11 (L) 12 - 20      GFR est AA >60 >60 ml/min/1.73m2    GFR est non-AA >60 >60 ml/min/1.73m2    Calcium 8.9 8.5 - 10.1 MG/DL    Bilirubin, total 0.3 0.2 - 1.0 MG/DL    ALT (SGPT) 26 12 - 78 U/L    AST (SGOT) 11 (L) 15 - 37 U/L    Alk. phosphatase 94 45 - 117 U/L    Protein, total 7.8 6.4 - 8.2 g/dL    Albumin 3.8 3.5 - 5.0 g/dL    Globulin 4.0 2.0 - 4.0 g/dL    A-G Ratio 1.0 (L) 1.1 - 2.2     CBC WITH AUTOMATED DIFF    Collection Time: 11/14/17  7:59 AM   Result Value Ref Range    WBC 13.9 (H) 3.6 - 11.0 K/uL    RBC 5.02 3.80 - 5.20 M/uL    HGB 15.5 11.5 - 16.0 g/dL    HCT 45.2 35.0 - 47.0 %    MCV 90.0 80.0 - 99.0 FL    MCH 30.9 26.0 - 34.0 PG    MCHC 34.3 30.0 - 36.5 g/dL    RDW 13.6 11.5 - 14.5 %    PLATELET 058 680 - 957 K/uL    NEUTROPHILS 82 (H) 32 - 75 %    LYMPHOCYTES 11 (L) 12 - 49 %    MONOCYTES 6 5 - 13 %    EOSINOPHILS 1 0 - 7 %    BASOPHILS 0 0 - 1 %    ABS. NEUTROPHILS 11.3 (H) 1.8 - 8.0 K/UL    ABS. LYMPHOCYTES 1.6 0.8 - 3.5 K/UL    ABS. MONOCYTES 0.8 0.0 - 1.0 K/UL    ABS. EOSINOPHILS 0.1 0.0 - 0.4 K/UL    ABS.  BASOPHILS 0.0 0.0 - 0.1 K/UL   LIPASE    Collection Time: 11/14/17  7:59 AM   Result Value Ref Range    Lipase 101 73 - 393 U/L   URINALYSIS W/ REFLEX CULTURE    Collection Time: 11/14/17 9:40 AM   Result Value Ref Range    Color YELLOW/STRAW      Appearance CLEAR CLEAR      Specific gravity 1.015 1.003 - 1.030      pH (UA) 6.0 5.0 - 8.0      Protein NEGATIVE  NEG mg/dL    Glucose NEGATIVE  NEG mg/dL    Ketone NEGATIVE  NEG mg/dL    Bilirubin NEGATIVE  NEG      Blood NEGATIVE  NEG      Urobilinogen 0.2 0.2 - 1.0 EU/dL    Nitrites NEGATIVE  NEG      Leukocyte Esterase NEGATIVE  NEG      WBC 0-4 0 - 4 /hpf    RBC 0-5 0 - 5 /hpf    Epithelial cells FEW FEW /lpf    Bacteria NEGATIVE  NEG /hpf    UA:UC IF INDICATED CULTURE NOT INDICATED BY UA RESULT CNI      Hyaline cast 0-2 0 - 5 /lpf       Radiologic Studies -  The following have been ordered and reviewed:    CT Results  (Last 48 hours)               11/14/17 0931  CT ABD PELV W CONT Final result    Impression:  IMPRESSION:    1. Large gallstone without evidence of acute cholecystitis or biliary duct   dilatation. 2. Status post hysterectomy. .               Narrative:  EXAM: CT ABDOMEN PELVIS WITH CONTRAST   INDICATION:  Abdominal pain with nausea and vomiting. COMPARISON: None. CONTRAST: 100 mL of Isovue-370. TECHNIQUE:    Multislice helical CT was performed from the diaphragm to the symphysis pubis   during uneventful rapid bolus intravenous contrast administration. Oral contrast   was not administered. Contiguous 5 mm axial images were reconstructed and lung   and soft tissue windows were generated. Coronal and sagittal reformations were   generated. CT dose reduction was achieved through use of a standardized protocol   tailored for this examination and automatic exposure control for dose   modulation. FINDINGS:   LOWER CHEST: The visualized portions of the lung bases are clear. ABDOMEN:   Liver: The liver is normal in size and contour with no focal abnormality. Gallbladder and bile ducts: There is a large stone in the neck of the   gallbladder. The gallbladder wall was not thickened and there is no   pericholecystic fluid. There is no biliary duct dilatation. Spleen: No abnormality. Pancreas: No abnormality. Adrenal glands: No abnormality. Kidneys: No abnormality. PELVIS:   Reproductive organs: The uterus is absent. Bladder: No abnormality. BOWEL AND MESENTERY: The small bowel is normal.  There is no mesenteric mass or   adenopathy. The appendix is normal.   PERITONEUM: There is no ascites or free intraperitoneal air. RETROPERITONEUM: The aorta  tapers without aneurysm. There is no retroperitoneal   adenopathy or mass. There is no pelvic mass or adenopathy. BONES AND SOFT TISSUES: The bones and soft tissues of the abdominal wall are   within normal limits. US RESULTS     EXAM:  US ABD LTD     INDICATION: Epigastric abdominal pain radiates into the lower abdomen, vomiting  for 4 days. Leukocytosis. Normal alkaline phosphatase and serum bilirubin.     COMPARISON:  None     TECHNIQUE:  Limited abdominal ultrasound.     FINDINGS:      Liver: echogenicity is within normal limits. No focal liver lesion.      Main portal vein flow: Toward the liver.     Fluid: No ascites.     Gallbladder: 1.5 cm gallstone is immobile in the gallbladder neck. Borderline  gallbladder wall thickening. No pericholecystic fluid. Positive sonographic  Pennington sign.      Bile ducts: There is no intra or extrahepatic biliary ductal dilatation. The  common bile duct measures 3 mm.     Pancreas: The visualized portions are within normal limits.      Kidneys: Right length: 10.0 cm. No hydronephrosis.     IMPRESSION  IMPRESSION:   1. Cholelithiasis is immobile in the gallbladder neck. 2. Findings are most compatible with early acute cholecystitis. 3. Normal CBD and liver.           Vital Signs-Reviewed the patient's vital signs.    Patient Vitals for the past 12 hrs:   Temp Resp BP SpO2   11/14/17 1330 - 16 124/64 95 %   11/14/17 1200 - 14 141/63 -   11/14/17 1000 - - - 98 %   11/14/17 0901 - 16 146/73 94 %   11/14/17 0751 97.7 °F (36.5 °C) 24 122/78 100 %       Medications Given in the ED:  Medications   morphine 2 mg/mL injection (  Canceled Entry 11/14/17 0857)   piperacillin-tazobactam (ZOSYN) 3.375 g in 0.9% sodium chloride (MBP/ADV) 100 mL (not administered)   0.9% sodium chloride infusion (not administered)   ondansetron (ZOFRAN) injection 4 mg (4 mg IntraVENous Given 11/14/17 0806)   morphine injection 4 mg (4 mg IntraVENous Given 11/14/17 0807)   sodium chloride 0.9 % bolus infusion 1,000 mL (0 mL IntraVENous IV Completed 11/14/17 0917)   morphine injection 6 mg (6 mg IntraVENous Given 11/14/17 0857)   0.9% sodium chloride infusion (50 mL/hr IntraVENous New Bag 11/14/17 0926)   iopamidol (ISOVUE-370) 76 % injection 100 mL (100 mL IntraVENous Given 11/14/17 0926)   sodium chloride (NS) flush 10 mL (10 mL IntraVENous Given 11/14/17 0926)   HYDROmorphone (PF) (DILAUDID) injection 1 mg (1 mg IntraVENous Given 11/14/17 1057)   HYDROmorphone (PF) (DILAUDID) injection 1 mg (1 mg IntraVENous Given 11/14/17 1358)       Pulse Oximetry Analysis - Normal 100% on room air       Diagnosis   Clinical Impression:   1. Acute cholecystitis         Plan:  1: Admit to hospitalist    Admit Note:  2:04 PM  Pt is being admitted by Dr Emilia Emerson. The results of their tests and reason(s) for their admission have been discussed with pt and/or available family. They convey agreement and understanding for the need to be admitted and for admission diagnosis. This note is prepared by Josi Billingsley, acting as a Scribe for Jamari Lake DO: The scribe's documentation has been prepared under my direction and personally reviewed by me in its entirety. I confirm that the notes above accurately reflects all work, treatment, procedures, and medical decision making performed by me.    _______________________________   Attestations:      This note is prepared by Josi Billingsley, acting as a Scribe for Clement Mcdowell, [facility] Chin Wayne DO: The scribe's documentation has been prepared under my direction and personally reviewed by me in its entirety.  I confirm that the notes above accurately reflects all work, treatment, procedures, and medical decision making performed by me.  _______________________________

## 2017-11-14 NOTE — IP AVS SNAPSHOT
[address] [facility]. 
[phone] Patient: Diana Vaughn MRN: [id number]:[id number] About your hospitalization You were admitted on:  November 15, 2017 You last received care in the:  [facility] 2 GENERAL SURGERY You were discharged on:  November 19, 2017 Why you were hospitalized Your primary diagnosis was:  Acute Calculous Cholecystitis Your diagnoses also included:  S/P Cholecystectomy Things You Need To Do (next 8 weeks) Schedule an appointment with Clarissa Bridges MD as soon as possible for a visit in 1 week(s)  
for a post hospital follow up appointment with PCP. Phone:  [phone] Where:  [address] [address], [address] [address] Schedule an appointment with Karlos Garcia MD as soon as possible for a visit in 2 week(s)  
with surgeon for a post hospital follow up appointment. Phone:  [phone] Where:  [address] [place] [address], [address] [address] Discharge Orders None A check suni indicates which time of day the medication should be taken. My Medications TAKE these medications as instructed Instructions Each Dose to Equal  
 Morning Noon Evening Bedtime ADDERALL 20 mg tablet Generic drug:  dextroamphetamine-amphetamine Your last dose was: Your next dose is: Take 20 mg by mouth two (2) times a day. 20 mg  
    
   
   
   
  
 bismuth subsalicylate 611 AR/69 mL suspension Commonly known as:  PEPTO-BISMOL Your last dose was: Your next dose is: Take  by mouth every four (4) hours as needed (upset stomach). Patient stated that she has been 'chugging' this agent out of the bottle. HYDROcodone-acetaminophen 5-325 mg per tablet Commonly known as:  Julaine Kava Your last dose was: Your next dose is: Take 1-2 Tabs by mouth every four (4) hours as needed for Pain. Max Daily Amount: 12 Tabs. 1-2 Tab  
    
   
   
   
  
 lithium carbonate 600 mg capsule Your last dose was: Your next dose is: Take 600 mg by mouth daily. 600 mg PREVACID 30 mg capsule Generic drug:  lansoprazole Your last dose was: Your next dose is: Take 30 mg by mouth nightly as needed (GERD). 30 mg  
    
   
   
   
  
 REQUIP 1 mg tablet Generic drug:  rOPINIRole Your last dose was: Your next dose is: Take 1 mg by mouth nightly. 1 mg  
    
   
   
   
  
 zolpidem 5 mg tablet Commonly known as:  AMBIEN Your last dose was: Your next dose is: Take 5 mg by mouth nightly as needed for Sleep.  
 5 mg ZyrTEC 10 mg Cap Generic drug:  Cetirizine Your last dose was: Your next dose is: Take 10 mg by mouth nightly. 10 mg Where to Get Your Medications Information on where to get these meds will be given to you by the nurse or doctor. ! Ask your nurse or doctor about these medications HYDROcodone-acetaminophen 5-325 mg per tablet Discharge Instructions Cholecystectomy: What to Expect at [facility] Your Recovery After your surgery, it is normal to feel weak and tired for several days after you return home. Your belly may be swollen. If you had laparoscopic surgery, you may also have pain in your shoulder for about 24 hours. You may have gas or need to burp a lot at first, and a few people get diarrhea. The diarrhea usually goes away in 2 to 4 weeks, but it may last longer. How quickly you recover depends on whether you had a laparoscopic or open surgery.  
· For a laparoscopic surgery, most people can go back to work or their normal routine in 1 to 2 weeks, but it may take longer, depending on the type of work you do. · For an open surgery, it will probably take 4 to 6 weeks before you get back to your normal routine. This care sheet gives you a general idea about how long it will take for you to recover. However, each person recovers at a different pace. Follow the steps below to get better as quickly as possible. How can you care for yourself at home? Activity · Rest when you feel tired. Getting enough sleep will help you recover. · Try to walk each day. Start out by walking a little more than you did the day before. Gradually increase the amount you walk. Walking boosts blood flow and helps prevent pneumonia and constipation. · For about 2 to 4 weeks, avoid lifting anything that would make you strain. This may include a child, heavy grocery bags and milk containers, a heavy briefcase or backpack, cat litter or dog food bags, or a vacuum . · Avoid strenuous activities, such as biking, jogging, weightlifting, and aerobic exercise, until your doctor says it is okay. · You may shower 24 to 48 hours after surgery, if your doctor okays it. Pat the cut (incision) dry. Do not take a bath for the first 2 weeks, or until your doctor tells you it is okay. · You may drive when you are no longer taking pain medicine and can quickly move your foot from the gas pedal to the brake. You must also be able to sit comfortably for a long period of time, even if you do not plan to go far. You might get caught in traffic. · For a laparoscopic surgery, most people can go back to work or their normal routine in 1 to 2 weeks, but it may take longer. For an open surgery, it will probably take 4 to 6 weeks before you get back to your normal routine. · Your doctor will tell you when you can have sex again. Diet · Eat smaller meals more often instead of fewer larger meals. You can eat a normal diet, but avoid eating fatty foods for about 1 month.  Fatty foods include hamburger, whole milk, cheese, and many snack foods. If your stomach is upset, try bland, low-fat foods like plain rice, broiled chicken, toast, and yogurt. · Drink plenty of fluids (unless your doctor tells you not to). · If you have diarrhea, try avoiding spicy foods, dairy products, fatty foods, and alcohol. You can also watch to see if specific foods cause it, and stop eating them. If the diarrhea continues for more than 2 weeks, talk to your doctor. · You may notice that your bowel movements are not regular right after your surgery. This is common. Try to avoid constipation and straining with bowel movements. You may want to take a fiber supplement every day. If you have not had a bowel movement after a couple of days, ask your doctor about taking a mild laxative. Medicines · Take pain medicines exactly as directed. ¨ If the doctor gave you a prescription medicine for pain, take it as prescribed. ¨ If you are not taking a prescription pain medicine, take an over-the-counter medicine such as acetaminophen (Tylenol), ibuprofen (Advil, Motrin), or naproxen (Aleve). Read and follow all instructions on the label. ¨ Do not take two or more pain medicines at the same time unless the doctor told you to. Many pain medicines contain acetaminophen, which is Tylenol. Too much Tylenol can be harmful. · If you think your pain medicine is making you sick to your stomach: 
¨ Take your medicine after meals (unless your doctor tells you not to). ¨ Ask your doctor for a different pain medicine. · If your doctor prescribed antibiotics, take them as directed. Do not stop taking them just because you feel better. You need to take the full course of antibiotics. Incision care · If you have strips of tape on the incision, or cut, leave the tape on for a week or until it falls off. · After 24 to 48 hours, wash the area daily with warm, soapy water, and pat it dry. · You may have staples to hold the cut together. Keep them dry until your doctor takes them out. This is usually in 7 to 10 days. · Keep the area clean and dry. You may cover it with a gauze bandage if it weeps or rubs against clothing. Change the bandage every day. Ice · To reduce swelling and pain, put ice or a cold pack on your belly for 10 to 20 minutes at a time. Do this every 1 to 2 hours. Put a thin cloth between the ice and your skin. Follow-up care is a key part of your treatment and safety. Be sure to make and go to all appointments, and call your doctor if you are having problems. Its also a good idea to know your test results and keep a list of the medicines you take. When should you call for help? Call 911 anytime you think you may need emergency care. For example, call if: 
· You passed out (lost consciousness). · You have severe trouble breathing. · You have sudden chest pain and shortness of breath, or you cough up blood. Call your doctor now or seek immediate medical care if: 
· You are sick to your stomach and cannot drink fluids. · You have pain that does not get better when you take your pain medicine. · You have signs of infection, such as: 
¨ Increased pain, swelling, warmth, or redness. ¨ Red streaks leading from the incision. ¨ Pus draining from the incision. ¨ Swollen lymph nodes in your neck, armpits, or groin. ¨ A fever. · Your urine turns dark brown or your stool is light-colored or sue-colored. · Your skin or the whites of your eyes turn yellow. · Bright red blood has soaked through a large bandage over your incision. · You have signs of a blood clot, such as: 
¨ Pain in your calf, back of knee, thigh, or groin. ¨ Redness and swelling in your leg or groin. · You have trouble passing urine or stool, especially if you have mild pain or swelling in your lower belly. Watch closely for any changes in your health, and be sure to contact your doctor if: · You had a laparoscopic surgery and your shoulder pain lasts more than 24 hours. · You do not have a bowel movement after taking a laxative. Where can you learn more? Go to My [employer] Enter 450 [id number] in the search box to learn more about \"Cholecystectomy: What to Expect at Home. \"  
© 5358-2176 Healthwise, Incorporated. Care instructions adapted under license by Amber Aguero (which disclaims liability or warranty for this information). This care instruction is for use with your licensed healthcare professional. If you have questions about a medical condition or this instruction, always ask your healthcare professional. [place] [address] any warranty or liability for your use of this information. Content Version: 73.7.075022; Current as of: November 14, 2014 [employer] Announcement We are excited to announce that we are making your provider's discharge notes available to you in [employer]. You will see these notes when they are completed and signed by the physician that discharged you from your recent hospital stay. If you have any questions or concerns about any information you see in [employer], please call the Health Information Department where you were seen or reach out to your Primary Care Provider for more information about your plan of care. Introducing [facility]! Amber Aguero introduces [employer] patient portal. Now you can access parts of your medical record, email your doctor's office, and request medication refills online. 1. In your internet browser, go to [email or web address] 2. Click on the First Time User? Click Here link in the Sign In box. You will see the New Member Sign Up page. 3. Enter your [employer] Access Code exactly as it appears below. You will not need to use this code after youve completed the sign-up process. If you do not sign up before the expiration date, you must request a new code. · [employer] Access Code: [email or web address] Expires: 2/12/2018  8:01 AM 
 
4. Enter the last four digits of your Social Security Number (xxxx) and Date of Birth (mm/dd/yyyy) as indicated and click Submit. You will be taken to the next sign-up page. 5. Create a [employer] Simple ID. This will be your [employer] login ID and cannot be changed, so think of one that is secure and easy to remember. 6. Create a [employer] password. You can change your password at any time. 7. Enter your Password Reset Question and Answer. This can be used at a later time if you forget your password. 8. Enter your e-mail address. You will receive e-mail notification when new information is available in [address] 9. Click Sign Up. You can now view and download portions of your medical record. 10. Click the Download Summary menu link to download a portable copy of your medical information. If you have questions, please visit the Frequently Asked Questions section of the [employer] website. Remember, [employer] is NOT to be used for urgent needs. For medical emergencies, dial 911. Now available from your iPhone and Android! Providers Seen During Your Hospitalization Provider Specialty Primary office phone Byron Kwok DO Emergency Medicine [phone] Elisabeth Wheeler MD General Surgery [phone] Immunizations Administered for This Admission Name Date Influenza Vaccine (Quad) PF  Deferred (),  Deferred () Your Primary Care Physician (PCP) Primary Care Physician Office Phone Office Fax Alessio Paniagua [phone] You are allergic to the following Allergen Reactions Wellbutrin (Bupropion) Hives Recent Documentation Height Weight BMI Smoking Status 1.651 m 70.7 kg 25.94 kg/m2 Current Every Day Smoker Emergency Contacts Name Discharge Info Relation Home Work Mobile [facility] 60 CAREGIVER [3] Spouse [3] [phone] Patient Belongings The following personal items are in your possession at time of discharge: 
  Dental Appliances: None  Visual Aid: Glasses Please provide this summary of care documentation to your next provider. Signatures-by signing, you are acknowledging that this After Visit Summary has been reviewed with you and you have received a copy. Patient Signature:  ____________________________________________________________ Date:  ____________________________________________________________  
  
Prim Moulding Provider Signature:  ____________________________________________________________ Date:  ____________________________________________________________

## 2017-11-14 NOTE — IP AVS SNAPSHOT
[address] [facility] 
[phone] Patient: Sebas Burgos MRN: [id number]:[id number] My Medications TAKE these medications as instructed Instructions Each Dose to Equal  
 Morning Noon Evening Bedtime ADDERALL 20 mg tablet Generic drug:  dextroamphetamine-amphetamine Your last dose was: Your next dose is: Take 20 mg by mouth two (2) times a day. 20 mg  
    
   
   
   
  
 bismuth subsalicylate 138 NK/65 mL suspension Commonly known as:  PEPTO-BISMOL Your last dose was: Your next dose is: Take  by mouth every four (4) hours as needed (upset stomach). Patient stated that she has been 'chugging' this agent out of the bottle. HYDROcodone-acetaminophen 5-325 mg per tablet Commonly known as:  Annamary Kailash Your last dose was: Your next dose is: Take 1-2 Tabs by mouth every four (4) hours as needed for Pain. Max Daily Amount: 12 Tabs. 1-2 Tab  
    
   
   
   
  
 lithium carbonate 600 mg capsule Your last dose was: Your next dose is: Take 600 mg by mouth daily. 600 mg PREVACID 30 mg capsule Generic drug:  lansoprazole Your last dose was: Your next dose is: Take 30 mg by mouth nightly as needed (GERD). 30 mg  
    
   
   
   
  
 REQUIP 1 mg tablet Generic drug:  rOPINIRole Your last dose was: Your next dose is: Take 1 mg by mouth nightly. 1 mg  
    
   
   
   
  
 zolpidem 5 mg tablet Commonly known as:  AMBIEN Your last dose was: Your next dose is: Take 5 mg by mouth nightly as needed for Sleep.  
 5 mg ZyrTEC 10 mg Cap Generic drug:  Cetirizine Your last dose was: Your next dose is: Take 10 mg by mouth nightly. 10 mg Where to Get Your Medications Information on where to get these meds will be given to you by the nurse or doctor. ! Ask your nurse or doctor about these medications HYDROcodone-acetaminophen 5-325 mg per tablet

## 2017-11-14 NOTE — ED NOTES
Spoke with Ugo Perez with [address] who states all rooms are unavailable at this time Left extension for US department to call when ready.

## 2017-11-14 NOTE — PROGRESS NOTES
Pharmacy Clarification of the Prior to Admission Medication Regimen Retrospective to the Admission Medication Reconciliation    The patient was interviewed regarding clarification of the prior to admission medication regimen.  was present in room and obtained permission from patient to discuss drug regimen with visitor(s) present. Patient was questioned regarding use of any other inhalers, topical products, over the counter medications, herbal medications, vitamin products or ophthalmic/nasal/otic medication use. Information Obtained From: Patient    Recommendations/Findings: The following amendments were made to the patient[facility] [address] [facility]:     1) Additions:    bismuth subsalicylate (PEPTO-BISMOL) 262 mg/15 mL suspension    2) Removals: NONE    3) Changes:   Cetirizine (ZYRTEC) 10 mg cap (Old regimen: take by mouth QHS /New regimen: 10 mg QHS)   lansoprazole (PREVACID) 30 mg capsule (Old regimen: take by mouth QHS /New regimen: 30 mg QHS PRN)   zolpidem (AMBIEN) 5 mg tablet (Old regimen: take by mouth QHS PRN /New regimen: 5 mg QHS PRN)    4) Pertinent Pharmacy Findings:   bismuth subsalicylate (PEPTO-BISMOL) 262 mg/15 mL suspension: Patient stated that she has been 'chugging' this agent out of the bottle, because of her upset stomach. Patient stated that she had gone through an entire bottle of this agent in 2 days. PTA medication list was corrected to the following:     Prior to Admission Medications   Prescriptions Last Dose Informant Patient Reported? Taking? Cetirizine (ZYRTEC) 10 mg cap 11/13/2017 at Unknown time Self Yes Yes   Sig: Take 10 mg by mouth nightly. bismuth subsalicylate (PEPTO-BISMOL) 262 mg/15 mL suspension 11/14/2017 at Unknown time Self Yes Yes   Sig: Take  by mouth every four (4) hours as needed (upset stomach). Patient stated that she has been 'chugging' this agent out of the bottle.    dextroamphetamine-amphetamine (ADDERALL) 20 mg tablet 11/7/2017 at Unknown time Self Yes Yes   Sig: Take 20 mg by mouth two (2) times a day. lansoprazole (PREVACID) 30 mg capsule 11/12/2017 at Unknown time Self Yes Yes   Sig: Take 30 mg by mouth nightly as needed (GERD). lithium carbonate 600 mg capsule 11/13/2017 at Unknown time Self Yes Yes   Sig: Take 600 mg by mouth daily. rOPINIRole (REQUIP) 1 mg tablet 11/13/2017 at Unknown time Self Yes Yes   Sig: Take 1 mg by mouth nightly. zolpidem (AMBIEN) 5 mg tablet 11/7/2017 at Unknown time Self Yes Yes   Sig: Take 5 mg by mouth nightly as needed for Sleep.       Facility-Administered Medications: None          James Palencia CPhT  Medication History Pharmacy Technician

## 2017-11-14 NOTE — ED NOTES
Pt resting soundly on stretcher with eyes closed Pt  remains bedside Pt  updated on pending CT scan for further plan of care.

## 2017-11-15 ENCOUNTER — APPOINTMENT (OUTPATIENT)
Dept: GENERAL RADIOLOGY | Age: 47
DRG: 415 | End: 2017-11-15
Attending: SURGERY
Payer: SELF-PAY

## 2017-11-15 PROBLEM — Z90.49 S/P CHOLECYSTECTOMY: Status: ACTIVE | Noted: 2017-11-15

## 2017-11-15 LAB
ABO + RH BLD: NORMAL
ANION GAP SERPL CALC-SCNC: 8 MMOL/L (ref 5–15)
ANION GAP SERPL CALC-SCNC: 9 MMOL/L (ref 5–15)
BASOPHILS # BLD: 0 K/UL
BASOPHILS # BLD: 0 K/UL (ref 0–0.1)
BASOPHILS NFR BLD: 0 %
BASOPHILS NFR BLD: 0 % (ref 0–1)
BLD PROD TYP BPU: NORMAL
BLD PROD TYP BPU: NORMAL
BLOOD GROUP ANTIBODIES SERPL: NORMAL
BNP SERPL-MCNC: 294 PG/ML (ref 0–125)
BPU ID: NORMAL
BPU ID: NORMAL
BUN SERPL-MCNC: 8 MG/DL (ref 6–20)
BUN SERPL-MCNC: 9 MG/DL (ref 6–20)
BUN/CREAT SERPL: 11 (ref 12–20)
BUN/CREAT SERPL: 9 (ref 12–20)
CALCIUM SERPL-MCNC: 6.2 MG/DL (ref 8.5–10.1)
CALCIUM SERPL-MCNC: 6.6 MG/DL (ref 8.5–10.1)
CHLORIDE SERPL-SCNC: 111 MMOL/L (ref 97–108)
CHLORIDE SERPL-SCNC: 113 MMOL/L (ref 97–108)
CO2 SERPL-SCNC: 20 MMOL/L (ref 21–32)
CO2 SERPL-SCNC: 20 MMOL/L (ref 21–32)
CREAT SERPL-MCNC: 0.8 MG/DL (ref 0.55–1.02)
CREAT SERPL-MCNC: 0.85 MG/DL (ref 0.55–1.02)
CROSSMATCH RESULT,%XM: NORMAL
CROSSMATCH RESULT,%XM: NORMAL
DIFFERENTIAL METHOD BLD: ABNORMAL
EOSINOPHIL # BLD: 0 K/UL
EOSINOPHIL # BLD: 0 K/UL (ref 0–0.4)
EOSINOPHIL NFR BLD: 0 %
EOSINOPHIL NFR BLD: 0 % (ref 0–7)
ERYTHROCYTE [DISTWIDTH] IN BLOOD BY AUTOMATED COUNT: 15.6 % (ref 11.5–14.5)
ERYTHROCYTE [DISTWIDTH] IN BLOOD BY AUTOMATED COUNT: 15.9 % (ref 11.5–14.5)
GLUCOSE SERPL-MCNC: 208 MG/DL (ref 65–100)
GLUCOSE SERPL-MCNC: 217 MG/DL (ref 65–100)
HCT VFR BLD AUTO: 27.6 % (ref 35–47)
HCT VFR BLD AUTO: 30.7 % (ref 35–47)
HGB BLD-MCNC: 10.3 G/DL (ref 11.5–16)
HGB BLD-MCNC: 7.5 G/DL (ref 11.5–16)
HGB BLD-MCNC: 8.5 G/DL (ref 11.5–16)
HGB BLD-MCNC: 9.3 G/DL (ref 11.5–16)
HGB BLD-MCNC: 9.4 G/DL (ref 11.5–16)
LYMPHOCYTES # BLD: 0.8 K/UL
LYMPHOCYTES # BLD: 1.2 K/UL (ref 0.8–3.5)
LYMPHOCYTES NFR BLD: 4 %
LYMPHOCYTES NFR BLD: 6 % (ref 12–49)
MCH RBC QN AUTO: 29 PG (ref 26–34)
MCH RBC QN AUTO: 29.3 PG (ref 26–34)
MCHC RBC AUTO-ENTMCNC: 33.6 G/DL (ref 30–36.5)
MCHC RBC AUTO-ENTMCNC: 33.7 G/DL (ref 30–36.5)
MCV RBC AUTO: 86.5 FL (ref 80–99)
MCV RBC AUTO: 87.1 FL (ref 80–99)
MONOCYTES # BLD: 1.3 K/UL
MONOCYTES # BLD: 1.5 K/UL (ref 0–1)
MONOCYTES NFR BLD: 6 %
MONOCYTES NFR BLD: 7 % (ref 5–13)
NEUTS BAND NFR BLD MANUAL: 7 %
NEUTS SEG # BLD: 17.9 K/UL (ref 1.8–8)
NEUTS SEG # BLD: 19 K/UL
NEUTS SEG NFR BLD: 83 %
NEUTS SEG NFR BLD: 87 % (ref 32–75)
PLATELET # BLD AUTO: 184 K/UL (ref 150–400)
PLATELET # BLD AUTO: 190 K/UL (ref 150–400)
POTASSIUM SERPL-SCNC: 4.3 MMOL/L (ref 3.5–5.1)
POTASSIUM SERPL-SCNC: 4.8 MMOL/L (ref 3.5–5.1)
RBC # BLD AUTO: 3.17 M/UL (ref 3.8–5.2)
RBC # BLD AUTO: 3.55 M/UL (ref 3.8–5.2)
RBC MORPH BLD: ABNORMAL
SODIUM SERPL-SCNC: 139 MMOL/L (ref 136–145)
SODIUM SERPL-SCNC: 142 MMOL/L (ref 136–145)
SPECIMEN EXP DATE BLD: NORMAL
STATUS OF UNIT,%ST: NORMAL
STATUS OF UNIT,%ST: NORMAL
UNIT DIVISION, %UDIV: 0
UNIT DIVISION, %UDIV: 0
WBC # BLD AUTO: 20.7 K/UL (ref 3.6–11)
WBC # BLD AUTO: 21.1 K/UL (ref 3.6–11)

## 2017-11-15 PROCEDURE — 36415 COLL VENOUS BLD VENIPUNCTURE: CPT | Performed by: EMERGENCY MEDICINE

## 2017-11-15 PROCEDURE — 88304 TISSUE EXAM BY PATHOLOGIST: CPT | Performed by: SURGERY

## 2017-11-15 PROCEDURE — 74011250636 HC RX REV CODE- 250/636: Performed by: ANESTHESIOLOGY

## 2017-11-15 PROCEDURE — 74011250636 HC RX REV CODE- 250/636: Performed by: SURGERY

## 2017-11-15 PROCEDURE — 74011250636 HC RX REV CODE- 250/636

## 2017-11-15 PROCEDURE — 74011250636 HC RX REV CODE- 250/636: Performed by: INTERNAL MEDICINE

## 2017-11-15 PROCEDURE — 65660000000 HC RM CCU STEPDOWN

## 2017-11-15 PROCEDURE — 74011000250 HC RX REV CODE- 250: Performed by: SURGERY

## 2017-11-15 PROCEDURE — 74011250637 HC RX REV CODE- 250/637: Performed by: SURGERY

## 2017-11-15 PROCEDURE — 80048 BASIC METABOLIC PNL TOTAL CA: CPT | Performed by: SURGERY

## 2017-11-15 PROCEDURE — 74011250637 HC RX REV CODE- 250/637: Performed by: ANESTHESIOLOGY

## 2017-11-15 PROCEDURE — 85018 HEMOGLOBIN: CPT

## 2017-11-15 PROCEDURE — C9113 INJ PANTOPRAZOLE SODIUM, VIA: HCPCS | Performed by: SURGERY

## 2017-11-15 PROCEDURE — 74011000258 HC RX REV CODE- 258: Performed by: SURGERY

## 2017-11-15 PROCEDURE — 85025 COMPLETE CBC W/AUTO DIFF WBC: CPT | Performed by: EMERGENCY MEDICINE

## 2017-11-15 PROCEDURE — 83880 ASSAY OF NATRIURETIC PEPTIDE: CPT | Performed by: EMERGENCY MEDICINE

## 2017-11-15 PROCEDURE — 74000 XR ABD PORT  1 V: CPT

## 2017-11-15 PROCEDURE — 80048 BASIC METABOLIC PNL TOTAL CA: CPT | Performed by: EMERGENCY MEDICINE

## 2017-11-15 RX ORDER — DEXTROSE, SODIUM CHLORIDE, AND POTASSIUM CHLORIDE 5; .45; .15 G/100ML; G/100ML; G/100ML
INJECTION INTRAVENOUS
Status: DISCONTINUED
Start: 2017-11-15 | End: 2017-11-15

## 2017-11-15 RX ORDER — HYDROCODONE BITARTRATE AND ACETAMINOPHEN 5; 325 MG/1; MG/1
1 TABLET ORAL AS NEEDED
Status: DISCONTINUED | OUTPATIENT
Start: 2017-11-15 | End: 2017-11-16

## 2017-11-15 RX ORDER — ZOLPIDEM TARTRATE 5 MG/1
5 TABLET ORAL
Status: DISCONTINUED | OUTPATIENT
Start: 2017-11-15 | End: 2017-11-19 | Stop reason: HOSPADM

## 2017-11-15 RX ORDER — SODIUM CHLORIDE, SODIUM LACTATE, POTASSIUM CHLORIDE, CALCIUM CHLORIDE 600; 310; 30; 20 MG/100ML; MG/100ML; MG/100ML; MG/100ML
125 INJECTION, SOLUTION INTRAVENOUS CONTINUOUS
Status: DISCONTINUED | OUTPATIENT
Start: 2017-11-15 | End: 2017-11-19

## 2017-11-15 RX ORDER — LITHIUM CARBONATE 300 MG/1
600 CAPSULE ORAL DAILY
Status: DISCONTINUED | OUTPATIENT
Start: 2017-11-15 | End: 2017-11-19 | Stop reason: HOSPADM

## 2017-11-15 RX ORDER — HYDROMORPHONE HYDROCHLORIDE 1 MG/ML
INJECTION, SOLUTION INTRAMUSCULAR; INTRAVENOUS; SUBCUTANEOUS
Status: DISCONTINUED
Start: 2017-11-15 | End: 2017-11-15

## 2017-11-15 RX ORDER — ONDANSETRON 2 MG/ML
4 INJECTION INTRAMUSCULAR; INTRAVENOUS
Status: DISCONTINUED | OUTPATIENT
Start: 2017-11-15 | End: 2017-11-19 | Stop reason: HOSPADM

## 2017-11-15 RX ORDER — FENTANYL CITRATE 50 UG/ML
25 INJECTION, SOLUTION INTRAMUSCULAR; INTRAVENOUS
Status: DISCONTINUED | OUTPATIENT
Start: 2017-11-15 | End: 2017-11-15

## 2017-11-15 RX ORDER — HYDROMORPHONE HYDROCHLORIDE 2 MG/ML
INJECTION, SOLUTION INTRAMUSCULAR; INTRAVENOUS; SUBCUTANEOUS AS NEEDED
Status: DISCONTINUED | OUTPATIENT
Start: 2017-11-15 | End: 2017-11-15 | Stop reason: HOSPADM

## 2017-11-15 RX ORDER — FENTANYL CITRATE 50 UG/ML
INJECTION, SOLUTION INTRAMUSCULAR; INTRAVENOUS
Status: DISCONTINUED
Start: 2017-11-15 | End: 2017-11-15

## 2017-11-15 RX ORDER — SODIUM CHLORIDE 0.9 % (FLUSH) 0.9 %
SYRINGE (ML) INJECTION
Status: COMPLETED
Start: 2017-11-15 | End: 2017-11-15

## 2017-11-15 RX ORDER — HYDROMORPHONE HCL IN 0.9% NACL 15 MG/30ML
PATIENT CONTROLLED ANALGESIA VIAL INTRAVENOUS CONTINUOUS
Status: DISCONTINUED | OUTPATIENT
Start: 2017-11-15 | End: 2017-11-19

## 2017-11-15 RX ORDER — DIPHENHYDRAMINE HYDROCHLORIDE 50 MG/ML
12.5 INJECTION, SOLUTION INTRAMUSCULAR; INTRAVENOUS AS NEEDED
Status: DISCONTINUED | OUTPATIENT
Start: 2017-11-15 | End: 2017-11-15

## 2017-11-15 RX ORDER — ONDANSETRON 2 MG/ML
4 INJECTION INTRAMUSCULAR; INTRAVENOUS AS NEEDED
Status: DISCONTINUED | OUTPATIENT
Start: 2017-11-15 | End: 2017-11-15

## 2017-11-15 RX ORDER — HYDROMORPHONE HYDROCHLORIDE 1 MG/ML
0.5 INJECTION, SOLUTION INTRAMUSCULAR; INTRAVENOUS; SUBCUTANEOUS
Status: DISCONTINUED | OUTPATIENT
Start: 2017-11-15 | End: 2017-11-15

## 2017-11-15 RX ORDER — DEXTROSE, SODIUM CHLORIDE, AND POTASSIUM CHLORIDE 5; .45; .15 G/100ML; G/100ML; G/100ML
125 INJECTION INTRAVENOUS CONTINUOUS
Status: DISCONTINUED | OUTPATIENT
Start: 2017-11-15 | End: 2017-11-15

## 2017-11-15 RX ORDER — BUPIVACAINE HYDROCHLORIDE 5 MG/ML
INJECTION, SOLUTION EPIDURAL; INTRACAUDAL AS NEEDED
Status: DISCONTINUED | OUTPATIENT
Start: 2017-11-15 | End: 2017-11-15

## 2017-11-15 RX ORDER — DEXTROAMPHETAMINE SACCHARATE, AMPHETAMINE ASPARTATE, DEXTROAMPHETAMINE SULFATE AND AMPHETAMINE SULFATE 2.5; 2.5; 2.5; 2.5 MG/1; MG/1; MG/1; MG/1
20 TABLET ORAL 2 TIMES DAILY
Status: DISCONTINUED | OUTPATIENT
Start: 2017-11-15 | End: 2017-11-15

## 2017-11-15 RX ORDER — MIDAZOLAM HYDROCHLORIDE 1 MG/ML
0.5 INJECTION, SOLUTION INTRAMUSCULAR; INTRAVENOUS
Status: DISCONTINUED | OUTPATIENT
Start: 2017-11-15 | End: 2017-11-15

## 2017-11-15 RX ORDER — ROPINIROLE 1 MG/1
1 TABLET, FILM COATED ORAL
Status: DISCONTINUED | OUTPATIENT
Start: 2017-11-15 | End: 2017-11-19 | Stop reason: HOSPADM

## 2017-11-15 RX ORDER — MUPIROCIN 20 MG/G
OINTMENT TOPICAL 2 TIMES DAILY
Status: DISCONTINUED | OUTPATIENT
Start: 2017-11-15 | End: 2017-11-19 | Stop reason: HOSPADM

## 2017-11-15 RX ADMIN — SODIUM CHLORIDE, SODIUM LACTATE, POTASSIUM CHLORIDE, AND CALCIUM CHLORIDE 100 ML/HR: 600; 310; 30; 20 INJECTION, SOLUTION INTRAVENOUS at 05:27

## 2017-11-15 RX ADMIN — MUPIROCIN: 20 OINTMENT TOPICAL at 08:31

## 2017-11-15 RX ADMIN — HYDROMORPHONE HYDROCHLORIDE 0.5 MG: 1 INJECTION, SOLUTION INTRAMUSCULAR; INTRAVENOUS; SUBCUTANEOUS at 07:30

## 2017-11-15 RX ADMIN — HYDROMORPHONE HYDROCHLORIDE 0.4 MG: 2 INJECTION, SOLUTION INTRAMUSCULAR; INTRAVENOUS; SUBCUTANEOUS at 01:17

## 2017-11-15 RX ADMIN — PIPERACILLIN SODIUM,TAZOBACTAM SODIUM 3.38 G: 3; .375 INJECTION, POWDER, FOR SOLUTION INTRAVENOUS at 21:43

## 2017-11-15 RX ADMIN — PIPERACILLIN SODIUM,TAZOBACTAM SODIUM 3.38 G: 3; .375 INJECTION, POWDER, FOR SOLUTION INTRAVENOUS at 05:22

## 2017-11-15 RX ADMIN — ALBUMIN HUMAN 250 ML: 50 SOLUTION INTRAVENOUS at 00:23

## 2017-11-15 RX ADMIN — ACETAMINOPHEN 1000 MG: 10 INJECTION, SOLUTION INTRAVENOUS at 00:37

## 2017-11-15 RX ADMIN — HYDROMORPHONE HYDROCHLORIDE 0.4 MG: 2 INJECTION, SOLUTION INTRAMUSCULAR; INTRAVENOUS; SUBCUTANEOUS at 00:00

## 2017-11-15 RX ADMIN — PIPERACILLIN SODIUM,TAZOBACTAM SODIUM 3.38 G: 3; .375 INJECTION, POWDER, FOR SOLUTION INTRAVENOUS at 14:44

## 2017-11-15 RX ADMIN — GLYCOPYRROLATE 0.6 MG: 0.2 INJECTION INTRAMUSCULAR; INTRAVENOUS at 00:45

## 2017-11-15 RX ADMIN — ONDANSETRON HYDROCHLORIDE 4 MG: 2 INJECTION, SOLUTION INTRAMUSCULAR; INTRAVENOUS at 09:34

## 2017-11-15 RX ADMIN — Medication: at 12:20

## 2017-11-15 RX ADMIN — HYDROMORPHONE HYDROCHLORIDE 0.4 MG: 2 INJECTION, SOLUTION INTRAMUSCULAR; INTRAVENOUS; SUBCUTANEOUS at 01:25

## 2017-11-15 RX ADMIN — MUPIROCIN: 20 OINTMENT TOPICAL at 19:11

## 2017-11-15 RX ADMIN — SODIUM CHLORIDE, SODIUM LACTATE, POTASSIUM CHLORIDE, AND CALCIUM CHLORIDE 125 ML/HR: 600; 310; 30; 20 INJECTION, SOLUTION INTRAVENOUS at 11:09

## 2017-11-15 RX ADMIN — HYDROMORPHONE HYDROCHLORIDE 0.5 MG: 1 INJECTION, SOLUTION INTRAMUSCULAR; INTRAVENOUS; SUBCUTANEOUS at 11:25

## 2017-11-15 RX ADMIN — HYDROCODONE BITARTRATE AND ACETAMINOPHEN 1 TABLET: 5; 325 TABLET ORAL at 09:06

## 2017-11-15 RX ADMIN — NEOSTIGMINE METHYLSULFATE 3 MG: 1 INJECTION INTRAVENOUS at 00:45

## 2017-11-15 RX ADMIN — DEXTROSE MONOHYDRATE, SODIUM CHLORIDE, AND POTASSIUM CHLORIDE 125 ML/HR: 50; 4.5; 1.49 INJECTION, SOLUTION INTRAVENOUS at 08:29

## 2017-11-15 RX ADMIN — ROCURONIUM BROMIDE 10 MG: 10 INJECTION, SOLUTION INTRAVENOUS at 00:07

## 2017-11-15 RX ADMIN — HYDROMORPHONE HYDROCHLORIDE 0.4 MG: 2 INJECTION, SOLUTION INTRAMUSCULAR; INTRAVENOUS; SUBCUTANEOUS at 00:31

## 2017-11-15 RX ADMIN — LITHIUM CARBONATE 600 MG: 300 CAPSULE, GELATIN COATED ORAL at 09:07

## 2017-11-15 RX ADMIN — ROPINIROLE HYDROCHLORIDE 1 MG: 1 TABLET, FILM COATED ORAL at 21:42

## 2017-11-15 RX ADMIN — SODIUM CHLORIDE 40 MG: 9 INJECTION INTRAMUSCULAR; INTRAVENOUS; SUBCUTANEOUS at 08:31

## 2017-11-15 RX ADMIN — ONDANSETRON HYDROCHLORIDE 4 MG: 2 INJECTION, SOLUTION INTRAMUSCULAR; INTRAVENOUS at 19:10

## 2017-11-15 RX ADMIN — SODIUM CHLORIDE 10 MG: 9 INJECTION INTRAMUSCULAR; INTRAVENOUS; SUBCUTANEOUS at 20:56

## 2017-11-15 RX ADMIN — HYDROMORPHONE HYDROCHLORIDE 0.5 MG: 1 INJECTION, SOLUTION INTRAMUSCULAR; INTRAVENOUS; SUBCUTANEOUS at 05:21

## 2017-11-15 RX ADMIN — FENTANYL CITRATE 25 MCG: 50 INJECTION, SOLUTION INTRAMUSCULAR; INTRAVENOUS at 01:17

## 2017-11-15 RX ADMIN — FENTANYL CITRATE 25 MCG: 50 INJECTION, SOLUTION INTRAMUSCULAR; INTRAVENOUS at 01:24

## 2017-11-15 RX ADMIN — HYDROMORPHONE HYDROCHLORIDE 0.5 MG: 1 INJECTION, SOLUTION INTRAMUSCULAR; INTRAVENOUS; SUBCUTANEOUS at 03:15

## 2017-11-15 RX ADMIN — Medication 10 ML: at 09:31

## 2017-11-15 RX ADMIN — HYDROMORPHONE HYDROCHLORIDE 0.5 MG: 1 INJECTION, SOLUTION INTRAMUSCULAR; INTRAVENOUS; SUBCUTANEOUS at 09:30

## 2017-11-15 RX ADMIN — HYDROMORPHONE HYDROCHLORIDE 0.4 MG: 2 INJECTION, SOLUTION INTRAMUSCULAR; INTRAVENOUS; SUBCUTANEOUS at 00:15

## 2017-11-15 NOTE — PROGRESS NOTES
Critical care interdisciplinary rounds held on 11/15/2017. Following members present, Pharmacy, Diabetes Treatment, Case Management, Occupational Therapy, Physical Therapy, Pastoral Care  and Nutrition. Plan of care discussed. See clinical pathway fo plan of care and interventions and desired outcomes.

## 2017-11-15 NOTE — PROGRESS NOTES
11/15/2017    INTENSIVIST PROGRESS NOTE:     Patient seen and evaluated, chart reviewed. Discussed with Dr. Maxi Mcnamara and Dr. Minor Hashimoto. Patient admitted and taken for cholecystectomy, encountered major bleeding in OR (3 liters blood loss), ultimately from tiny variant vessel. She is hemodynamically stable and on room air. Has some abdominal pain and nausea as expected. Now pt in CCU for observation.     ROS: 10 point ROS negative except as above    Visit Vitals    /67    Pulse (!) 103    Temp 96.6 °F (35.9 °C)    Resp 23    Ht 5' 5\" (1.651 m)    Wt 70.7 kg (155 lb 13.8 oz)    SpO2 100%    BMI 25.94 kg/m2       General: alert, NAD  CV: RRR, no murmur  Lungs: CTA B, no wheeze  Abd: distended, appropriately tender, hypoactive bowel sounds  Ext: no cyanosis, no clubbing  Neuro:alert, nonfocal exam    CT report reviewed    Labs reviewed    A/P:  - acalculous s/p cholecystectomy - postop care  - arterial bleeding during surgery, converted to open case - hemodynamically stable  - postop anemia - no current need for transfusion   - DVT prophylaxis  - transfer to floor later today if she remains stable  Jena Galdamez MD

## 2017-11-15 NOTE — PROGRESS NOTES
Spiritual Care Assessment/Progress Notes    Naomi Lloyd [id number]  [id number]    1970  56 y.o.  female    Patient Telephone Number: [phone] (home)   Protestant Affiliation: Scientology   Language: English   Extended Emergency Contact Information  Primary Emergency Contact: [address] Phone: [phone]  Relation: Other Relative   Patient Active Problem List    Diagnosis Date Noted    S/P cholecystectomy 11/15/2017    Acute calculous cholecystitis 11/14/2017    Female pelvic pain 11/13/2014    Dysmenorrhea 11/13/2014        Date: 11/15/2017       Level of Protestant/Spiritual Activity:  [x]         Involved in otf tradition/spiritual practice    []         Not involved in otf tradition/spiritual practice  [x]         Spiritually oriented    []         Claims no spiritual orientation    []         seeking spiritual identity  []         Feels alienated from Mormonism practice/tradition  []         Feels angry about Mormonism practice/tradition  [x]         Spirituality/Mormonism tradition is a resource for coping at this time.   []         Not able to assess due to medical condition    Services Provided Today:  []         crisis intervention    []         reading Scriptures  [x]         spiritual assessment    [x]         prayer  [x]         empathic listening/emotional support  []         rites and rituals (cite in comments)  []         life review     []         Mormonism support  []         theological development    []         advocacy  []         ethical dialog     []         blessing  []         bereavement support    []         support to family  []         anticipatory grief support   []         help with AMD  []         spiritual guidance    []         meditation      Spiritual Care Needs  [x]         Emotional Support  []         Spiritual/Protestant Care  []         Loss/Adjustment  []         Advocacy/Referral                /Ethics  []         No needs expressed at               this time  []         Other: (note in               comments)  [address]  []         Follow up visits with               pt/family  []         Provide materials  [x]         Schedule sacraments  [x]         Contact Community               Clergy  []         Follow up as needed  []         Other: (note in               comments)     Comments:   [facility] patient in CCU to inquire about patient's desire to receive Sacrament of the Sick. She would like to be anointed tomorrow when Ressie Zohra make hospital visits. Patient shared she is a practicing Che[employer]ism. She was receptive to assurance of prayer. Advised her of  availability.   EDWAR Quintanilla, [facility], [address] [place] [address] Paging Service  [phone])

## 2017-11-15 NOTE — ANESTHESIA POSTPROCEDURE EVALUATION
Post-Anesthesia Evaluation and Assessment    Patient: Raheem Katz MRN: [id number]  SSN: [id number]    YOB: 1970  Age: 56 y.o. Sex: female       Cardiovascular Function/Vital Signs  Visit Vitals    /72    Pulse 73    Temp 36.9 °C (98.5 °F)    Resp 9    Ht 5' 5\" (1.651 m)    Wt 70.7 kg (155 lb 13.8 oz)    SpO2 99%    BMI 25.94 kg/m2       Patient is status post general anesthesia for Procedure(s):  CHOLECYSTECTOMY LAPAROSCOPIC CONVERTED TO OPEN AT 6437O REPAIR OF ARTERY. Nausea/Vomiting: None    Postoperative hydration reviewed and adequate. Pain:  Pain Scale 1: Numeric (0 - 10) (11/14/17 1851)  Pain Intensity 1: 7 (11/14/17 1851)   Managed    Neurological Status:   Neuro  Neurologic State: Alert (11/15/17 0250)  Orientation Level: Oriented X4 (11/15/17 0250)  Cognition: Follows commands (11/15/17 0250)  Speech: Clear (11/15/17 0250)  LUE Motor Response: Purposeful;Spontaneous  (11/15/17 0250)  LLE Motor Response: Purposeful;Spontaneous  (11/15/17 0250)  RUE Motor Response: Purposeful;Spontaneous  (11/15/17 0250)  RLE Motor Response: Purposeful;Spontaneous  (11/15/17 0250)   At baseline    Mental Status and Level of Consciousness: Arousable    Pulmonary Status:   O2 Device: Room air (11/15/17 0250)   Adequate oxygenation and airway patent    Complications related to anesthesia: None    Post-anesthesia assessment completed.  No concerns    Signed By: Tammy Marroquin MD     November 15, 2017

## 2017-11-15 NOTE — DIABETES MGMT
DTC Progress Note    Recommendations/ Comments: Pt discussed with rounding team and Dr. Geri Everett- plan to d/c dextrose in IVF given hyperglycemia- will also check A1C to assess possible underlying diabetes      Chart reviewed on [address] during Multidisciplinary Rounds. A1c:   No results found for: HBA1C, HGBE8, JCH3SWUE        Recent Glucose Results:   Lab Results   Component Value Date/Time     (H) 11/15/2017 05:15 AM     (H) 11/15/2017 01:35 AM        Lab Results   Component Value Date/Time    Creatinine 0.80 11/15/2017 05:15 AM     Estimated Creatinine Clearance: 86.7 mL/min (based on Cr of 0.8). Active Orders   Diet    DIET CLEAR LIQUID        PO intake: No data found. Will continue to follow as needed. Thank you.   Pravin Barnes, [address] [place] [address], [address]  Office:  [phone]

## 2017-11-15 NOTE — ANESTHESIA PREPROCEDURE EVALUATION
Anesthetic History   No history of anesthetic complications            Review of Systems / Medical History  Patient summary reviewed, nursing notes reviewed and pertinent labs reviewed    Pulmonary  Within defined limits                 Neuro/Psych         Psychiatric history     Cardiovascular  Within defined limits                     GI/Hepatic/Renal  Within defined limits              Endo/Other  Within defined limits           Other Findings   Comments: Cholelithiasis           Physical Exam    Airway  Mallampati: II  TM Distance: > 6 cm  Neck ROM: normal range of motion   Mouth opening: Normal     Cardiovascular  Regular rate and rhythm,  S1 and S2 normal,  no murmur, click, rub, or gallop             Dental  No notable dental hx       Pulmonary  Breath sounds clear to auscultation               Abdominal  GI exam deferred       Other Findings            Anesthetic Plan    ASA: 2  Anesthesia type: general    Monitoring Plan: BIS      Induction: Intravenous  Anesthetic plan and risks discussed with: Patient

## 2017-11-15 NOTE — PROGRESS NOTES
Admit Date: 2017    POD 1 Day Post-Op    Procedure:  Procedure(s):  CHOLECYSTECTOMY LAPAROSCOPIC CONVERTED TO OPEN AT 2135H REPAIR OF ARTERY    Subjective:     Patient has no complaints. Objective:     Blood pressure 111/72, pulse 73, temperature 98.5 °F (36.9 °C), resp. rate 9, height 5' 5\" (1.651 m), weight 155 lb 13.8 oz (70.7 kg), SpO2 99 %. Temp (24hrs), Av.1 °F (36.7 °C), Min:97.7 °F (36.5 °C), Max:98.5 °F (36.9 °C)      Physical Exam:  GENERAL: alert, cooperative, no distress, appears stated age, LUNG: clear to auscultation bilaterally, HEART: regular rate and rhythm, ABDOMEN: soft, non-tender. Bowel sounds normal. No masses,  no organomegaly, wounds dressed and dry, JOHN o/p serosanguinous, EXTREMITIES:  extremities normal, atraumatic, no cyanosis or edema    Labs:   Recent Results (from the past 24 hour(s))   METABOLIC PANEL, COMPREHENSIVE    Collection Time: 17  7:59 AM   Result Value Ref Range    Sodium 137 136 - 145 mmol/L    Potassium 4.0 3.5 - 5.1 mmol/L    Chloride 104 97 - 108 mmol/L    CO2 24 21 - 32 mmol/L    Anion gap 9 5 - 15 mmol/L    Glucose 126 (H) 65 - 100 mg/dL    BUN 8 6 - 20 MG/DL    Creatinine 0.70 0.55 - 1.02 MG/DL    BUN/Creatinine ratio 11 (L) 12 - 20      GFR est AA >60 >60 ml/min/1.73m2    GFR est non-AA >60 >60 ml/min/1.73m2    Calcium 8.9 8.5 - 10.1 MG/DL    Bilirubin, total 0.3 0.2 - 1.0 MG/DL    ALT (SGPT) 26 12 - 78 U/L    AST (SGOT) 11 (L) 15 - 37 U/L    Alk.  phosphatase 94 45 - 117 U/L    Protein, total 7.8 6.4 - 8.2 g/dL    Albumin 3.8 3.5 - 5.0 g/dL    Globulin 4.0 2.0 - 4.0 g/dL    A-G Ratio 1.0 (L) 1.1 - 2.2     CBC WITH AUTOMATED DIFF    Collection Time: 17  7:59 AM   Result Value Ref Range    WBC 13.9 (H) 3.6 - 11.0 K/uL    RBC 5.02 3.80 - 5.20 M/uL    HGB 15.5 11.5 - 16.0 g/dL    HCT 45.2 35.0 - 47.0 %    MCV 90.0 80.0 - 99.0 FL    MCH 30.9 26.0 - 34.0 PG    MCHC 34.3 30.0 - 36.5 g/dL    RDW 13.6 11.5 - 14.5 %    PLATELET 269 328 - 732 K/uL NEUTROPHILS 82 (H) 32 - 75 %    LYMPHOCYTES 11 (L) 12 - 49 %    MONOCYTES 6 5 - 13 %    EOSINOPHILS 1 0 - 7 %    BASOPHILS 0 0 - 1 %    ABS. NEUTROPHILS 11.3 (H) 1.8 - 8.0 K/UL    ABS. LYMPHOCYTES 1.6 0.8 - 3.5 K/UL    ABS. MONOCYTES 0.8 0.0 - 1.0 K/UL    ABS. EOSINOPHILS 0.1 0.0 - 0.4 K/UL    ABS.  BASOPHILS 0.0 0.0 - 0.1 K/UL   LIPASE    Collection Time: 11/14/17  7:59 AM   Result Value Ref Range    Lipase 101 73 - 393 U/L   URINALYSIS W/ REFLEX CULTURE    Collection Time: 11/14/17  9:40 AM   Result Value Ref Range    Color YELLOW/STRAW      Appearance CLEAR CLEAR      Specific gravity 1.015 1.003 - 1.030      pH (UA) 6.0 5.0 - 8.0      Protein NEGATIVE  NEG mg/dL    Glucose NEGATIVE  NEG mg/dL    Ketone NEGATIVE  NEG mg/dL    Bilirubin NEGATIVE  NEG      Blood NEGATIVE  NEG      Urobilinogen 0.2 0.2 - 1.0 EU/dL    Nitrites NEGATIVE  NEG      Leukocyte Esterase NEGATIVE  NEG      WBC 0-4 0 - 4 /hpf    RBC 0-5 0 - 5 /hpf    Epithelial cells FEW FEW /lpf    Bacteria NEGATIVE  NEG /hpf    UA:UC IF INDICATED CULTURE NOT INDICATED BY UA RESULT CNI      Hyaline cast 0-2 0 - 5 /lpf   TYPE + CROSSMATCH    Collection Time: 11/14/17  9:25 PM   Result Value Ref Range    Crossmatch Expiration 11/17/2017     ABO/Rh(D) O POSITIVE     Antibody screen NEG     Unit number [id number]     Blood component type RC LR CPD     Unit division 00     Status of unit ISSUED     Crossmatch result Compatible     Unit number [id number]     Blood component type RC LR AS1     Unit division 00     Status of unit ISSUED     Crossmatch result Compatible     Unit number [id number]     Blood component type RC LR AS1     Unit division 00     Status of unit ALLOCATED     Crossmatch result Compatible     Unit number [id number]     Blood component type RC LR AS1     Unit division 00     Status of unit ALLOCATED     Crossmatch result Compatible     Unit number [id number]     Blood component type RC LR AS1     Unit division 00     Status of unit ALLOCATED     Crossmatch result Compatible     Unit number [id number]     Blood component type RC LR AS1     Unit division 00     Status of unit ALLOCATED     Crossmatch result Compatible    EMERGENT RELEASE OF UNCROSSMATCHED RED CELLS    Collection Time: 11/14/17  9:45 PM   Result Value Ref Range    Crossmatch Expiration 11/17/2017     ABO/Rh(D) PENDING     Antibody screen PENDING     Unit number [id number]     Blood component type RC LR AS1     Unit division 00     Status of unit ISSUED     Crossmatch result Emergency Release     Unit number [id number]     Blood component type  LR AS1     Unit division 00     Status of unit ISSUED     Crossmatch result Emergency Release    POC HEMOGLOBIN    Collection Time: 11/14/17  9:47 PM   Result Value Ref Range    Hemoglobin (POC) 7.5 (L) 11.5 - 16.0     POC HEMOGLOBIN    Collection Time: 11/14/17 10:43 PM   Result Value Ref Range    Hemoglobin (POC) 9.4 (L) 11.5 - 16.0     POC HEMOGLOBIN    Collection Time: 11/15/17 12:13 AM   Result Value Ref Range    Hemoglobin (POC) 8.5 (L) 11.5 - 89.8     METABOLIC PANEL, BASIC    Collection Time: 11/15/17  1:35 AM   Result Value Ref Range    Sodium 142 136 - 145 mmol/L    Potassium 4.3 3.5 - 5.1 mmol/L    Chloride 113 (H) 97 - 108 mmol/L    CO2 20 (L) 21 - 32 mmol/L    Anion gap 9 5 - 15 mmol/L    Glucose 217 (H) 65 - 100 mg/dL    BUN 8 6 - 20 MG/DL    Creatinine 0.85 0.55 - 1.02 MG/DL    BUN/Creatinine ratio 9 (L) 12 - 20      GFR est AA >60 >60 ml/min/1.73m2    GFR est non-AA >60 >60 ml/min/1.73m2    Calcium 6.2 (LL) 8.5 - 10.1 MG/DL   NT-PRO BNP    Collection Time: 11/15/17  1:35 AM   Result Value Ref Range    NT pro- (H) 0 - 125 PG/ML   CBC WITH AUTOMATED DIFF    Collection Time: 11/15/17  1:35 AM   Result Value Ref Range    WBC 20.7 (H) 3.6 - 11.0 K/uL    RBC 3.17 (L) 3.80 - 5.20 M/uL    HGB 9.3 (L) 11.5 - 16.0 g/dL    HCT 27.6 (L) 35.0 - 47.0 %    MCV 87.1 80.0 - 99.0 FL    MCH 29.3 26.0 - 34.0 PG    MCHC 33.7 30.0 - 36.5 g/dL    RDW 15.6 (H) 11.5 - 14.5 %    PLATELET 590 776 - 236 K/uL    NEUTROPHILS 87 (H) 32 - 75 %    LYMPHOCYTES 6 (L) 12 - 49 %    MONOCYTES 7 5 - 13 %    EOSINOPHILS 0 0 - 7 %    BASOPHILS 0 0 - 1 %    ABS. NEUTROPHILS 17.9 (H) 1.8 - 8.0 K/UL    ABS. LYMPHOCYTES 1.2 0.8 - 3.5 K/UL    ABS. MONOCYTES 1.5 (H) 0.0 - 1.0 K/UL    ABS. EOSINOPHILS 0.0 0.0 - 0.4 K/UL    ABS. BASOPHILS 0.0 0.0 - 0.1 K/UL   CBC WITH AUTOMATED DIFF    Collection Time: 11/15/17  5:15 AM   Result Value Ref Range    WBC 21.1 (H) 3.6 - 11.0 K/uL    RBC 3.55 (L) 3.80 - 5.20 M/uL    HGB 10.3 (L) 11.5 - 16.0 g/dL    HCT 30.7 (L) 35.0 - 47.0 %    MCV 86.5 80.0 - 99.0 FL    MCH 29.0 26.0 - 34.0 PG    MCHC 33.6 30.0 - 36.5 g/dL    RDW 15.9 (H) 11.5 - 14.5 %    PLATELET 372 715 - 806 K/uL    NEUTROPHILS 83 %    BAND NEUTROPHILS 7 %    LYMPHOCYTES 4 %    MONOCYTES 6 %    EOSINOPHILS 0 %    BASOPHILS 0 %    ABS. NEUTROPHILS 19.0 K/UL    ABS. LYMPHOCYTES 0.8 K/UL    ABS. MONOCYTES 1.3 K/UL    ABS. EOSINOPHILS 0.0 K/UL    ABS.  BASOPHILS 0.0 K/UL    RBC COMMENTS NORMOCYTIC, NORMOCHROMIC      DF SMEAR SCANNED     METABOLIC PANEL, BASIC    Collection Time: 11/15/17  5:15 AM   Result Value Ref Range    Sodium 139 136 - 145 mmol/L    Potassium 4.8 3.5 - 5.1 mmol/L    Chloride 111 (H) 97 - 108 mmol/L    CO2 20 (L) 21 - 32 mmol/L    Anion gap 8 5 - 15 mmol/L    Glucose 208 (H) 65 - 100 mg/dL    BUN 9 6 - 20 MG/DL    Creatinine 0.80 0.55 - 1.02 MG/DL    BUN/Creatinine ratio 11 (L) 12 - 20      GFR est AA >60 >60 ml/min/1.73m2    GFR est non-AA >60 >60 ml/min/1.73m2    Calcium 6.6 (L) 8.5 - 10.1 MG/DL       Data Review images and reports reviewed    Assessment:     Principal Problem:    Acute calculous cholecystitis (11/14/2017)    Active Problems:    S/P cholecystectomy (11/15/2017)        Plan/Recommendations/Medical Decision Making:     Continue present treatment   Pt looks remarkably good following 3 liter blood loss during routine lap cholecystectomy converted to open laparotomy and control of bleeding. Hb 10 this morning after 4 units PRBC - 2 units O negative emergency blood and 2 units type specific O positive blood. Urine output adequate but borderline, will maintain andres today and bolus as necessary if output drops off. Clear liquids  Continue zosyn today  Possible transfer to floor later today if she continues to look good. Rizwan Grimm.  Kameron Montiel MD, [facility]

## 2017-11-15 NOTE — PERIOP NOTES
Tamiko Avalos, charge nurse notified patient's family of need for Dr. Amanda Pinedo to open patient and control her bleeding.

## 2017-11-15 NOTE — PROGRESS NOTES
0225: PACU called report. 0240: Assessment completed. Patient A&Ox4. Lungs diminished bilaterally on RA. NSR/ST on the monitor. Abdominal dressing clean dry and intact. 2 JOHN drains present right side of abdomen. Patient very anxious.     0302: Paged Dr. Ita Hayes about critical calcium of 6.2 and no pain medication order. New order received for Dilaudid 0.5 Q2 PRN. 0330: Family at bedside. 8621: Medicated with 0.5 of Dilaudid. 0515: Medicated with 0.5 of Dilaudid. 0530: Reassessment completed. 0700: Bedside and verbal report given to Chan Jolley RN.    0715: Dr. Ita Hayes at bedside.

## 2017-11-16 LAB
ANION GAP SERPL CALC-SCNC: 8 MMOL/L (ref 5–15)
BASOPHILS # BLD: 0 K/UL (ref 0–0.1)
BASOPHILS NFR BLD: 0 % (ref 0–1)
BUN SERPL-MCNC: 7 MG/DL (ref 6–20)
BUN/CREAT SERPL: 12 (ref 12–20)
CALCIUM SERPL-MCNC: 7.7 MG/DL (ref 8.5–10.1)
CHLORIDE SERPL-SCNC: 106 MMOL/L (ref 97–108)
CO2 SERPL-SCNC: 24 MMOL/L (ref 21–32)
CREAT SERPL-MCNC: 0.59 MG/DL (ref 0.55–1.02)
EOSINOPHIL # BLD: 0 K/UL (ref 0–0.4)
EOSINOPHIL NFR BLD: 0 % (ref 0–7)
ERYTHROCYTE [DISTWIDTH] IN BLOOD BY AUTOMATED COUNT: 15.8 % (ref 11.5–14.5)
EST. AVERAGE GLUCOSE BLD GHB EST-MCNC: 103 MG/DL
GLUCOSE SERPL-MCNC: 124 MG/DL (ref 65–100)
HBA1C MFR BLD: 5.2 % (ref 4.2–6.3)
HCT VFR BLD AUTO: 24.2 % (ref 35–47)
HGB BLD-MCNC: 8.3 G/DL (ref 11.5–16)
LYMPHOCYTES # BLD: 2 K/UL (ref 0.8–3.5)
LYMPHOCYTES NFR BLD: 13 % (ref 12–49)
MCH RBC QN AUTO: 29.1 PG (ref 26–34)
MCHC RBC AUTO-ENTMCNC: 34.3 G/DL (ref 30–36.5)
MCV RBC AUTO: 84.9 FL (ref 80–99)
MONOCYTES # BLD: 1.6 K/UL (ref 0–1)
MONOCYTES NFR BLD: 11 % (ref 5–13)
NEUTS SEG # BLD: 11.4 K/UL (ref 1.8–8)
NEUTS SEG NFR BLD: 76 % (ref 32–75)
PLATELET # BLD AUTO: 166 K/UL (ref 150–400)
POTASSIUM SERPL-SCNC: 4.1 MMOL/L (ref 3.5–5.1)
RBC # BLD AUTO: 2.85 M/UL (ref 3.8–5.2)
SODIUM SERPL-SCNC: 138 MMOL/L (ref 136–145)
WBC # BLD AUTO: 15 K/UL (ref 3.6–11)

## 2017-11-16 PROCEDURE — 74011250636 HC RX REV CODE- 250/636: Performed by: SURGERY

## 2017-11-16 PROCEDURE — 74011250636 HC RX REV CODE- 250/636: Performed by: INTERNAL MEDICINE

## 2017-11-16 PROCEDURE — 80048 BASIC METABOLIC PNL TOTAL CA: CPT | Performed by: INTERNAL MEDICINE

## 2017-11-16 PROCEDURE — 74011250637 HC RX REV CODE- 250/637: Performed by: ANESTHESIOLOGY

## 2017-11-16 PROCEDURE — 36415 COLL VENOUS BLD VENIPUNCTURE: CPT | Performed by: INTERNAL MEDICINE

## 2017-11-16 PROCEDURE — C9113 INJ PANTOPRAZOLE SODIUM, VIA: HCPCS | Performed by: SURGERY

## 2017-11-16 PROCEDURE — 74011250637 HC RX REV CODE- 250/637: Performed by: SURGERY

## 2017-11-16 PROCEDURE — 65660000000 HC RM CCU STEPDOWN

## 2017-11-16 PROCEDURE — 85025 COMPLETE CBC W/AUTO DIFF WBC: CPT | Performed by: INTERNAL MEDICINE

## 2017-11-16 PROCEDURE — 74011000258 HC RX REV CODE- 258: Performed by: SURGERY

## 2017-11-16 PROCEDURE — 83036 HEMOGLOBIN GLYCOSYLATED A1C: CPT | Performed by: INTERNAL MEDICINE

## 2017-11-16 PROCEDURE — 74011000250 HC RX REV CODE- 250: Performed by: SURGERY

## 2017-11-16 RX ORDER — HYDROCODONE BITARTRATE AND ACETAMINOPHEN 5; 325 MG/1; MG/1
1 TABLET ORAL
Status: DISCONTINUED | OUTPATIENT
Start: 2017-11-16 | End: 2017-11-19

## 2017-11-16 RX ORDER — NEOSTIGMINE METHYLSULFATE 1 MG/ML
INJECTION INTRAVENOUS AS NEEDED
Status: DISCONTINUED | OUTPATIENT
Start: 2017-11-15 | End: 2017-11-16 | Stop reason: HOSPADM

## 2017-11-16 RX ORDER — GLYCOPYRROLATE 0.2 MG/ML
INJECTION INTRAMUSCULAR; INTRAVENOUS AS NEEDED
Status: DISCONTINUED | OUTPATIENT
Start: 2017-11-15 | End: 2017-11-16 | Stop reason: HOSPADM

## 2017-11-16 RX ADMIN — PIPERACILLIN SODIUM,TAZOBACTAM SODIUM 3.38 G: 3; .375 INJECTION, POWDER, FOR SOLUTION INTRAVENOUS at 22:08

## 2017-11-16 RX ADMIN — HYDROCODONE BITARTRATE AND ACETAMINOPHEN 1 TABLET: 5; 325 TABLET ORAL at 11:03

## 2017-11-16 RX ADMIN — MUPIROCIN: 20 OINTMENT TOPICAL at 17:20

## 2017-11-16 RX ADMIN — ROPINIROLE HYDROCHLORIDE 1 MG: 1 TABLET, FILM COATED ORAL at 22:08

## 2017-11-16 RX ADMIN — SODIUM CHLORIDE 40 MG: 9 INJECTION INTRAMUSCULAR; INTRAVENOUS; SUBCUTANEOUS at 08:51

## 2017-11-16 RX ADMIN — SODIUM CHLORIDE, SODIUM LACTATE, POTASSIUM CHLORIDE, AND CALCIUM CHLORIDE 125 ML/HR: 600; 310; 30; 20 INJECTION, SOLUTION INTRAVENOUS at 14:38

## 2017-11-16 RX ADMIN — PIPERACILLIN SODIUM,TAZOBACTAM SODIUM 3.38 G: 3; .375 INJECTION, POWDER, FOR SOLUTION INTRAVENOUS at 14:38

## 2017-11-16 RX ADMIN — LITHIUM CARBONATE 600 MG: 300 CAPSULE, GELATIN COATED ORAL at 08:50

## 2017-11-16 RX ADMIN — MUPIROCIN: 20 OINTMENT TOPICAL at 08:50

## 2017-11-16 RX ADMIN — PIPERACILLIN SODIUM,TAZOBACTAM SODIUM 3.38 G: 3; .375 INJECTION, POWDER, FOR SOLUTION INTRAVENOUS at 06:25

## 2017-11-16 RX ADMIN — HYDROCODONE BITARTRATE AND ACETAMINOPHEN 1 TABLET: 5; 325 TABLET ORAL at 17:20

## 2017-11-16 NOTE — OP NOTES
[address], [address]   OP NOTE       Name:  Alvin Middleton   MR#:  [id number]   :  1970   Account #:  [de-identified]    Surgery Date:  2017   Date of Adm:  2017       PREOPERATIVE DIAGNOSIS: Acute calculus cholecystitis. POSTOPERATIVE DIAGNOSIS: Acute calculus cholecystitis. PROCEDURES PERFORMED: Laparoscopic converted to open   cholecystectomy and repair of massive operative bleeding. SURGEON: Jennifer Watkins. Anastacia Hernandez MD    CO-SURGEONS    1. Galo Ta MD   2. Niya Gastelum MD     ANESTHESIA: General endotracheal anesthesia by Hali Byrd MD.    FINDINGS: Acute calculus cholecystitis and an aberrant artery branch   in the gallbladder fossa, which appears to be a branch of the cystic   artery. ESTIMATED BLOOD LOSS: 3 L. SPECIMENS REMOVED: Gallbladder and contents. FINDINGS: Aberrant vascular anatomy, resulting in massive   exsanguination, requiring conversion to open laparotomy with packing,   and extensive exploration of vascular control to correct this. COMPLICATIONS: Life-threatening blood loss, with conversion to   open operation. DESCRIPTION OF PROCEDURE: After appropriate consent, the   patient, who was competent, was brought to the operating room, made   comfortable in the supine position and administered general   anesthesia. The patient was prepped and draped in the standard   fashion. Marcaine 0.5% solution was used to infiltrate the trocar sites   at the umbilicus and 2 sites in the right upper quadrant. A 15 blade was   used to incise the skin at the umbilicus, this was extended down   through the midline fascia, and a Real trocar was placed. The   abdominal cavity was insufflated with CO2 gas to 15 cm mmHg. The   abdominal cavity was explored. Under direct visualization, two 5 mm   trocars were placed in the upper abdomen. The gallbladder was   identified.  It was tense, and was decompressed with an aspiration   needle, grasped fundus and elevated over the inferior edge of the liver. The neck of the gallbladder was stripped down, and the cystic artery   was clearly identified, freed up proximally and distally, ligated with clips   right at the junction of the cystic duct gallbladder junction and divided   between clips. Two vascular branches were identified superiorly, and   deep to this, 1 which appeared to be a small venous structure, and   then a larger vessel, which appeared to be cystic artery. The   peritoneum was taken down around the gallbladder circumferentially   with the hook electrocautery, to further help clarify this anatomy. This   was cleared. Both vascular structures were clipped, proximally and   distally. All venous structure was then divided with Endoshears, and   immediately upon doing this, there was a rush of bright arterial-  appearing blood filling the field. Attempts at controlling this made with   placing 2 Ray-Tecs through the Real trocar to be used to   tamponade the bleeding, but this readily apparent to not be beneficial.   We immediately removed the trocars and converted to an open   subcostal incision on the right, extending across the midline a bit. The   abdomen was opened, and immediately 10 laparotomy packs were   placed in the right upper quadrant to tamponade the bleeding. Pressure was held here, as the Bookwalter retractor was obtained and set up. Three laps were placed above the liver to bring the liver down into the   operative field, and once this was all controlled and retractors were in   place to provide decent exposure, the packs were slowly and   methodically taken down. Upon doing this, area of dissection was   identified again. Clips were visualized intact on the small vascular   structure which had been clipped, and there was no immediate   bleeding.  Within about 30 seconds, presumably, the clot blew off, and   again, the field was immediately filled with bright arterial-appearing   blood. Packing was again placed, and Dr. Seb Hardin was called to   come and assist. The subcostal incision was extended while we were   awaiting his arrival, and better exposure was afford, and once Dr. Seb Hardin was available, we again slowly took the packs down and   look at this area, and again, there was immediate welling up of blood. The patient was typed and crossed, and 2 units of O negative   emergency blood were sent for and transfused at this point. The   abdomen was explored at this time, while maintaining packing in the   right upper quadrant. The lesser sac was opened, and there was no   blood identified in the lesser sac initially, which would have suggested   a portal vein injury. The middle colic vein was identified, and this was   followed down, and the portal vein was identified. This was freed up   circumferentially, and encircled with a vessel loop in a Coleman fashion, to   allow for vascular control. The duodenum was kocherized, and this   was all mobilized medially, and again, there was no evidence of   involvement in any injury to vascular structure in this area. The packs   again were removed, and again, immediate welling up blood was   encountered, which was difficult to control. At this time, we asked Dr. Sherry Paulino also to come in to assist with the procedure, and once he was   available, the site of bleeding was able to be identified. This clearly   was not involving the portal vein, it was lateral to the common bile duct,   and was arising from the liver bed, just posterior to where the cystic   duct was. It appeared to be an aberrant arterial branch, presumably off   of the cystic artery. With finger compression of this area proximally, the   area was able to be oversewn with 5-0 Prolene stitch, with good   hemostasis.  Once this was completed, the gallbladder was taken down   from the gallbladder fossa with the Bovie electrocautery, without any   significant bleeding encountered. The gallbladder was forwarded to   Pathology for examination. The gallbladder fossa was inspected. Additional electrocautery was used to afford good hemostasis. At this   time, the gallbladder fossa and the area where the suture repair was,   which was the area of the cystic duct stump, were sprayed with Tisseel   to help preclude any further bleeding. At this point, the patient had had   a total of 2 units of O negative emergency blood, and 2 units of O   positive type specific blood, and had a Hemacue reading of about 9.5 in the   OR. The abdomen was further explored, no other problems were noted. The   vessel loop was removed from the portal vein, the bowel was all   returned to the native location. Two 15 fluted drains were placed, one   through a trocar site, and one through a separate stab incision in the   right upper abdomen. These were sewn in place with 2-0 nylon stitches,   one was placed in the lateral paracolic area extending up over the   dome of the liver, the other was left in the gallbladder fossa. The   abdomen was then closed in layers with #1 PDS running suture as a   posterior fascial closure layer, and an anterior fascial layers. The   wound was irrigated with saline solution. Deep dermis was   approximated with a 2-0 chromic stitch, and then the skin incision was   closed with a skin stapler. The Real trocar site was also closed with   0 Vicryl suture and skin staples. At this point, the wounds were   cleaned, dried and dressed with sterile dressings. A Segovia catheter   was placed. An x-ray was obtained in the OR prior to transporting the   patient, because the lap count was off by an increment of 5, which   presumably was from the count rather than retained sponge. When the   x-ray was obtained, this confirmed there was no retained foreign body   within the abdomen.  At this time, the patient was awakened, extubated   and transferred to the recovery room in guarded but stable condition. Estimated blood loss was 3 L throughout the case, and a CBC   obtained in the recovery room showed a hemoglobin of 10.         Matheus Rodriguez MD       / [facility]   D:  11/16/2017   09:50   T:  11/16/2017   13:49   Job #:  [id number]

## 2017-11-16 NOTE — PROGRESS NOTES
Admit Date: 2017    POD 2 Day Post-Op    Procedure:  Procedure(s):  CHOLECYSTECTOMY LAPAROSCOPIC CONVERTED TO OPEN AT 2135H REPAIR OF ARTERY    Subjective:     Patient has no complaints. Had nausea this am, improved with compazine. Objective:     Blood pressure 105/85, pulse 100, temperature 98.4 °F (36.9 °C), resp. rate 18, height 5' 5\" (1.651 m), weight 155 lb 13.8 oz (70.7 kg), SpO2 100 %. Temp (24hrs), Av.3 °F (36.8 °C), Min:97.8 °F (36.6 °C), Max:99.4 °F (37.4 °C)      Physical Exam:  GENERAL: alert, cooperative, no distress, appears stated age, LUNG: clear to auscultation bilaterally, HEART: regular rate and rhythm, ABDOMEN: soft, non-tender. Bowel sounds normal. No masses,  no organomegaly, wounds dressed and dry, JOHN o/p serosanguinous, EXTREMITIES:  extremities normal, atraumatic, no cyanosis or edema    Labs:   Recent Results (from the past 24 hour(s))   CBC WITH AUTOMATED DIFF    Collection Time: 17  4:47 AM   Result Value Ref Range    WBC 15.0 (H) 3.6 - 11.0 K/uL    RBC 2.85 (L) 3.80 - 5.20 M/uL    HGB 8.3 (L) 11.5 - 16.0 g/dL    HCT 24.2 (L) 35.0 - 47.0 %    MCV 84.9 80.0 - 99.0 FL    MCH 29.1 26.0 - 34.0 PG    MCHC 34.3 30.0 - 36.5 g/dL    RDW 15.8 (H) 11.5 - 14.5 %    PLATELET 905 511 - 612 K/uL    NEUTROPHILS 76 (H) 32 - 75 %    LYMPHOCYTES 13 12 - 49 %    MONOCYTES 11 5 - 13 %    EOSINOPHILS 0 0 - 7 %    BASOPHILS 0 0 - 1 %    ABS. NEUTROPHILS 11.4 (H) 1.8 - 8.0 K/UL    ABS. LYMPHOCYTES 2.0 0.8 - 3.5 K/UL    ABS. MONOCYTES 1.6 (H) 0.0 - 1.0 K/UL    ABS. EOSINOPHILS 0.0 0.0 - 0.4 K/UL    ABS.  BASOPHILS 0.0 0.0 - 0.1 K/UL   METABOLIC PANEL, BASIC    Collection Time: 17  4:47 AM   Result Value Ref Range    Sodium 138 136 - 145 mmol/L    Potassium 4.1 3.5 - 5.1 mmol/L    Chloride 106 97 - 108 mmol/L    CO2 24 21 - 32 mmol/L    Anion gap 8 5 - 15 mmol/L    Glucose 124 (H) 65 - 100 mg/dL    BUN 7 6 - 20 MG/DL    Creatinine 0.59 0.55 - 1.02 MG/DL    BUN/Creatinine ratio 12 12 - 20      GFR est AA >60 >60 ml/min/1.73m2    GFR est non-AA >60 >60 ml/min/1.73m2    Calcium 7.7 (L) 8.5 - 10.1 MG/DL       Data Review images and reports reviewed    Assessment:     Principal Problem:    Acute calculous cholecystitis (11/14/2017)    Active Problems:    S/P cholecystectomy (11/15/2017)        Plan/Recommendations/Medical Decision Making:     Continue present treatment   Pt looks remarkably good following 3 liter blood loss during routine lap cholecystectomy converted to open laparotomy and control of bleeding. Hb 8.3 this morning after 4 units PRBC - 2 units O negative emergency blood and 2 units type specific O positive blood. Drifted down from 10 yesterday morning, not surprising after massive loss and resuscitation. Good urine output. Clear liquids  Continue zosyn today      Pb Trejo MD, [facility]

## 2017-11-16 NOTE — ADDENDUM NOTE
Addendum  created 11/16/17 1201 by Selina Kelly CRNA    Anesthesia Event edited, Anesthesia Intra Flowsheets edited, Anesthesia Intra Meds edited, Orders acknowledged in Narrator, Procedure Event Log accessed

## 2017-11-16 NOTE — PROGRESS NOTES
Contacted Dr. Meghna Ramírez regarding K-pad for patient complaints of muscle pain in back. Order received.

## 2017-11-16 NOTE — PROGRESS NOTES
Interdisciplinary Rounds were completed on this patient. Rounds included nursing, clinical care leader, pharmacy, and case management. Patient was doing well without problems. Patient had the following concerns: back pain. Goals for the day will include: mobilize. Nursing to follow up with MD about K-pad for back pain.

## 2017-11-16 NOTE — PROGRESS NOTES
Responded to in-box request to provide assistance with completion of Advanced Medical Directive (AMD). Explained document to patient and patient's family who was present at the bedside. Patient indicated she may wish to complete but would like to discuss with her  first. Left blank AMD document and informational brochure with patient as requested. Advised patient and family of  availability to assist with completion as needed or desired. No other spiritual care needs at this time.  DALE NuñezDiv.    Paging Service [phone])

## 2017-11-16 NOTE — PROGRESS NOTES
CM attempted to meet with pt to complete initial assessment. Pt advised she was in too much discomfort at this time and CM to follow-up later.      Sarah Jimenez, MSW, [address] [place] [address]   [phone]

## 2017-11-16 NOTE — PROGRESS NOTES
General Surgery End of Shift Nursing Note    Bedside shift change report given to Hood Krishnamurthy (oncoming nurse) by Te Rodriguez RN (offgoing nurse). Report included the following information SBAR, Kardex, Intake/Output, MAR and Recent Results. Shift worked:   7a-7p   Significant changes during shift:       Non-emergent issues for physician to address:        Number times ambulated in hallway past shift: 0. Up to bathroom. Number of times OOB to chair past shift: 1    Pain Management:  Current medication: Norco, Dilaudid PCA  Patient states pain is manageable on current pain medication: YES    GI:    Current diet:  DIET NPO With [employer] current diet: YES  Passing flatus: YES  Last Bowel Movement: several days ago   Appearance:     Respiratory:    Incentive Spirometer at bedside: YES  Patient instructed on use: YES    Patient Safety:    Falls Score: 1  Bed Alarm On? No  Sitter?  No    Beni Archuleta

## 2017-11-16 NOTE — PROGRESS NOTES
Problem: Falls - Risk of  Goal: *Absence of Falls  Document Serge Fall Risk and appropriate interventions in the flowsheet.    Outcome: Progressing Towards Goal  Fall Risk Interventions:            Medication Interventions: Assess postural VS orthostatic hypotension, Evaluate medications/consider consulting pharmacy, Patient to call before getting OOB, Teach patient to arise slowly    Elimination Interventions: Call light in reach, Elevated toilet seat, Patient to call for help with toileting needs, Toilet paper/wipes in reach

## 2017-11-17 PROCEDURE — 77010033678 HC OXYGEN DAILY

## 2017-11-17 PROCEDURE — 74011250636 HC RX REV CODE- 250/636: Performed by: INTERNAL MEDICINE

## 2017-11-17 PROCEDURE — 74011000250 HC RX REV CODE- 250: Performed by: SURGERY

## 2017-11-17 PROCEDURE — 74011250637 HC RX REV CODE- 250/637: Performed by: SURGERY

## 2017-11-17 PROCEDURE — 74011250636 HC RX REV CODE- 250/636: Performed by: SURGERY

## 2017-11-17 PROCEDURE — 65660000000 HC RM CCU STEPDOWN

## 2017-11-17 PROCEDURE — C9113 INJ PANTOPRAZOLE SODIUM, VIA: HCPCS | Performed by: SURGERY

## 2017-11-17 PROCEDURE — 74011000258 HC RX REV CODE- 258: Performed by: SURGERY

## 2017-11-17 RX ORDER — CETIRIZINE HCL 10 MG
10 TABLET ORAL EVERY EVENING
Status: DISCONTINUED | OUTPATIENT
Start: 2017-11-17 | End: 2017-11-19 | Stop reason: HOSPADM

## 2017-11-17 RX ORDER — PANTOPRAZOLE SODIUM 40 MG/1
40 TABLET, DELAYED RELEASE ORAL
Status: DISCONTINUED | OUTPATIENT
Start: 2017-11-18 | End: 2017-11-19 | Stop reason: HOSPADM

## 2017-11-17 RX ADMIN — PIPERACILLIN SODIUM,TAZOBACTAM SODIUM 3.38 G: 3; .375 INJECTION, POWDER, FOR SOLUTION INTRAVENOUS at 14:54

## 2017-11-17 RX ADMIN — SODIUM CHLORIDE 40 MG: 9 INJECTION INTRAMUSCULAR; INTRAVENOUS; SUBCUTANEOUS at 08:41

## 2017-11-17 RX ADMIN — SODIUM CHLORIDE, SODIUM LACTATE, POTASSIUM CHLORIDE, AND CALCIUM CHLORIDE 125 ML/HR: 600; 310; 30; 20 INJECTION, SOLUTION INTRAVENOUS at 17:09

## 2017-11-17 RX ADMIN — Medication: at 08:12

## 2017-11-17 RX ADMIN — PIPERACILLIN SODIUM,TAZOBACTAM SODIUM 3.38 G: 3; .375 INJECTION, POWDER, FOR SOLUTION INTRAVENOUS at 21:21

## 2017-11-17 RX ADMIN — MUPIROCIN: 20 OINTMENT TOPICAL at 17:12

## 2017-11-17 RX ADMIN — LITHIUM CARBONATE 600 MG: 300 CAPSULE, GELATIN COATED ORAL at 08:48

## 2017-11-17 RX ADMIN — CETIRIZINE HYDROCHLORIDE 10 MG: 10 TABLET, FILM COATED ORAL at 17:12

## 2017-11-17 RX ADMIN — ROPINIROLE HYDROCHLORIDE 1 MG: 1 TABLET, FILM COATED ORAL at 21:20

## 2017-11-17 RX ADMIN — PIPERACILLIN SODIUM,TAZOBACTAM SODIUM 3.38 G: 3; .375 INJECTION, POWDER, FOR SOLUTION INTRAVENOUS at 06:26

## 2017-11-17 NOTE — PROGRESS NOTES
Patient meets criteria for oral conversion. Will switch to PO protonix per P&T protocol.   LINCOLN ChenD

## 2017-11-17 NOTE — PROGRESS NOTES
Admit Date: 2017    POD 3 Day Post-Op    Procedure:  Procedure(s):  CHOLECYSTECTOMY LAPAROSCOPIC CONVERTED TO OPEN AT 2135H REPAIR OF ARTERY    Subjective:     Patient with burping, no N/V. No flatus or BM yet. C/o back pain. Objective:     Blood pressure 121/66, pulse 90, temperature 98.2 °F (36.8 °C), resp. rate 16, height 5' 5\" (1.651 m), weight 155 lb 13.8 oz (70.7 kg), SpO2 94 %. Temp (24hrs), Av.2 °F (36.8 °C), Min:97.6 °F (36.4 °C), Max:98.8 °F (37.1 °C)      Physical Exam:  GENERAL: alert, cooperative, no distress, appears stated age, LUNG: clear to auscultation bilaterally, HEART: regular rate and rhythm, ABDOMEN: soft, non-tender. Bowel sounds normal. No masses,  no organomegaly, wounds dressed and dry, JOHN o/p serosanguinous, EXTREMITIES:  extremities normal, atraumatic, no cyanosis or edema    Labs:   No results found for this or any previous visit (from the past 24 hour(s)). Data Review images and reports reviewed    Assessment:     Principal Problem:    Acute calculous cholecystitis (2017)    Active Problems:    S/P cholecystectomy (11/15/2017)        Plan/Recommendations/Medical Decision Making:     Continue present treatment   Pt looks remarkably good following 3 liter blood loss during routine lap cholecystectomy converted to open laparotomy and control of bleeding. Hb 8.3 yesterday morning after 4 units PRBC - 2 units O negative emergency blood and 2 units type specific O positive blood. Drifted down from 10 postop, not surprising after massive loss and resuscitation. Good urine output. Clear liquids until return of bowel fucntion  Continue zosyn today  Ambulate in [facility] PRAVEEN Apodaca MD, [facility]

## 2017-11-17 NOTE — CDMP QUERY
Account Number: [de-identified]  MRN: [id number]  Patient: Sherlyn uRff  Created: [id number]  Clinician Name: Claire Rosario RN, CCDS   Dr. Reji Meza [place] PRAVEEN :  Please clarify if this patient is being treated/managed for:    => Acute Blood Loss Anemia in the setting of encountered major bleeding in OR (3 liters blood loss),from an aberrant tiny artery requiring 4 units PRBC transfusion  =>Other Explanation of clinical findings  =>Unable to Determine (no explanation of clinical findings)    The medical record reflects the following clinical findings, treatment, and risk factors:    Risk Factors: surgery, aberrant artery  Clinical Indicators:  massive operative  bleeding, 3L blood loss, Hgb drifted down from 15  to10 postop. Treatment: emergency blood transfusion & H/H monitoring. Please clarify and document your clinical opinion in the progress notes and discharge summary including the definitive and/or presumptive diagnosis, (suspected or probable), related to the above clinical findings. Please include clinical findings supporting your diagnosis.     Thank Andrea Ford RN, CCDS

## 2017-11-18 LAB
ABO + RH BLD: NORMAL
BASOPHILS # BLD: 0 K/UL (ref 0–0.1)
BASOPHILS NFR BLD: 0 % (ref 0–1)
BLD PROD TYP BPU: NORMAL
BLOOD GROUP ANTIBODIES SERPL: NORMAL
BPU ID: NORMAL
CROSSMATCH RESULT,%XM: NORMAL
DIFFERENTIAL METHOD BLD: ABNORMAL
EOSINOPHIL # BLD: 0.3 K/UL (ref 0–0.4)
EOSINOPHIL NFR BLD: 3 % (ref 0–7)
ERYTHROCYTE [DISTWIDTH] IN BLOOD BY AUTOMATED COUNT: 15 % (ref 11.5–14.5)
HCT VFR BLD AUTO: 20.1 % (ref 35–47)
HGB BLD-MCNC: 6.7 G/DL (ref 11.5–16)
LYMPHOCYTES # BLD: 1.5 K/UL (ref 0.8–3.5)
LYMPHOCYTES NFR BLD: 15 % (ref 12–49)
MCH RBC QN AUTO: 29.4 PG (ref 26–34)
MCHC RBC AUTO-ENTMCNC: 33.3 G/DL (ref 30–36.5)
MCV RBC AUTO: 88.2 FL (ref 80–99)
MONOCYTES # BLD: 0.9 K/UL (ref 0–1)
MONOCYTES NFR BLD: 9 % (ref 5–13)
NEUTS SEG # BLD: 7.1 K/UL (ref 1.8–8)
NEUTS SEG NFR BLD: 73 % (ref 32–75)
PLATELET # BLD AUTO: 170 K/UL (ref 150–400)
RBC # BLD AUTO: 2.28 M/UL (ref 3.8–5.2)
RBC MORPH BLD: ABNORMAL
SPECIMEN EXP DATE BLD: NORMAL
STATUS OF UNIT,%ST: NORMAL
UNIT DIVISION, %UDIV: 0
WBC # BLD AUTO: 9.8 K/UL (ref 3.6–11)

## 2017-11-18 PROCEDURE — 65660000000 HC RM CCU STEPDOWN

## 2017-11-18 PROCEDURE — 74011250636 HC RX REV CODE- 250/636: Performed by: SURGERY

## 2017-11-18 PROCEDURE — 85025 COMPLETE CBC W/AUTO DIFF WBC: CPT | Performed by: SURGERY

## 2017-11-18 PROCEDURE — 74011250636 HC RX REV CODE- 250/636: Performed by: INTERNAL MEDICINE

## 2017-11-18 PROCEDURE — 86923 COMPATIBILITY TEST ELECTRIC: CPT | Performed by: SURGERY

## 2017-11-18 PROCEDURE — 77030014006 HC SPNG HEMSTAT J&J -A

## 2017-11-18 PROCEDURE — 74011250637 HC RX REV CODE- 250/637: Performed by: SURGERY

## 2017-11-18 PROCEDURE — 86900 BLOOD TYPING SEROLOGIC ABO: CPT | Performed by: SURGERY

## 2017-11-18 PROCEDURE — 74011000258 HC RX REV CODE- 258: Performed by: SURGERY

## 2017-11-18 PROCEDURE — 36415 COLL VENOUS BLD VENIPUNCTURE: CPT | Performed by: SURGERY

## 2017-11-18 PROCEDURE — 74011000250 HC RX REV CODE- 250: Performed by: SURGERY

## 2017-11-18 PROCEDURE — P9016 RBC LEUKOCYTES REDUCED: HCPCS | Performed by: SURGERY

## 2017-11-18 PROCEDURE — 36430 TRANSFUSION BLD/BLD COMPNT: CPT

## 2017-11-18 RX ORDER — SODIUM CHLORIDE 9 MG/ML
250 INJECTION, SOLUTION INTRAVENOUS AS NEEDED
Status: DISCONTINUED | OUTPATIENT
Start: 2017-11-18 | End: 2017-11-19 | Stop reason: HOSPADM

## 2017-11-18 RX ORDER — POLYETHYLENE GLYCOL 3350 17 G/17G
17 POWDER, FOR SOLUTION ORAL 2 TIMES DAILY
Status: DISCONTINUED | OUTPATIENT
Start: 2017-11-18 | End: 2017-11-19 | Stop reason: HOSPADM

## 2017-11-18 RX ADMIN — LITHIUM CARBONATE 600 MG: 300 CAPSULE, GELATIN COATED ORAL at 09:53

## 2017-11-18 RX ADMIN — SODIUM CHLORIDE 10 MG: 9 INJECTION INTRAMUSCULAR; INTRAVENOUS; SUBCUTANEOUS at 12:39

## 2017-11-18 RX ADMIN — SODIUM CHLORIDE, SODIUM LACTATE, POTASSIUM CHLORIDE, AND CALCIUM CHLORIDE 125 ML/HR: 600; 310; 30; 20 INJECTION, SOLUTION INTRAVENOUS at 04:38

## 2017-11-18 RX ADMIN — CETIRIZINE HYDROCHLORIDE 10 MG: 10 TABLET, FILM COATED ORAL at 18:32

## 2017-11-18 RX ADMIN — PIPERACILLIN SODIUM,TAZOBACTAM SODIUM 3.38 G: 3; .375 INJECTION, POWDER, FOR SOLUTION INTRAVENOUS at 22:55

## 2017-11-18 RX ADMIN — PIPERACILLIN SODIUM,TAZOBACTAM SODIUM 3.38 G: 3; .375 INJECTION, POWDER, FOR SOLUTION INTRAVENOUS at 17:27

## 2017-11-18 RX ADMIN — PIPERACILLIN SODIUM,TAZOBACTAM SODIUM 3.38 G: 3; .375 INJECTION, POWDER, FOR SOLUTION INTRAVENOUS at 06:30

## 2017-11-18 RX ADMIN — PANTOPRAZOLE SODIUM 40 MG: 40 TABLET, DELAYED RELEASE ORAL at 06:37

## 2017-11-18 RX ADMIN — POLYETHYLENE GLYCOL 3350 17 G: 17 POWDER, FOR SOLUTION ORAL at 09:54

## 2017-11-18 RX ADMIN — ROPINIROLE HYDROCHLORIDE 1 MG: 1 TABLET, FILM COATED ORAL at 22:55

## 2017-11-18 RX ADMIN — ONDANSETRON HYDROCHLORIDE 4 MG: 2 INJECTION, SOLUTION INTRAMUSCULAR; INTRAVENOUS at 12:38

## 2017-11-18 NOTE — PROGRESS NOTES
General Surgery End of Shift Nursing Note    Bedside shift change report given to MOE Hathaway (oncoming nurse) by Luis Manuel Moses (offgoing nurse). Report included the following information SBAR, Kardex, Intake/Output and MAR. Shift worked:   11p-7a   Summary of shift:       Issues for physician to address:        Number times ambulated in hallway past shift:     Number of times OOB to chair past shift:     Pain Management:  Current medication: Dilaudid  Patient states pain is manageable on current pain medication: YES    GI:    Current diet:  DIET CLEAR LIQUID    Tolerating current diet: YES  Passing flatus: NO  Last Bowel Movement:    Appearance:     Respiratory:    Incentive Spirometer at bedside: YES  Patient instructed on use: YES    Patient Safety:    Falls Score: 2  Bed Alarm On? No  Sitter?  No    Sadaf James House RN

## 2017-11-18 NOTE — PROGRESS NOTES
Admit Date: 2017    POD 4 Day Post-Op    Procedure:  Procedure(s):  CHOLECYSTECTOMY LAPAROSCOPIC CONVERTED TO OPEN AT 2135H REPAIR OF ARTERY    Subjective:     Patient feeling better, michael clears well but no flatus yet. Objective:     Blood pressure 109/58, pulse 94, temperature 98.9 °F (37.2 °C), resp. rate 18, height 5' 5\" (1.651 m), weight 155 lb 13.8 oz (70.7 kg), SpO2 95 %. Temp (24hrs), Av.6 °F (37 °C), Min:98 °F (36.7 °C), Max:99 °F (37.2 °C)      Physical Exam:  GENERAL: alert, cooperative, no distress, appears stated age, LUNG: clear to auscultation bilaterally, HEART: regular rate and rhythm, ABDOMEN: soft, non-tender. Bowel sounds normal. No masses,  no organomegaly, wounds dressed and dry, JOHN o/p serosanguinous, EXTREMITIES:  extremities normal, atraumatic, no cyanosis or edema    Labs:   Recent Results (from the past 24 hour(s))   CBC WITH AUTOMATED DIFF    Collection Time: 17  4:30 AM   Result Value Ref Range    WBC 9.8 3.6 - 11.0 K/uL    RBC 2.28 (L) 3.80 - 5.20 M/uL    HGB 6.7 (L) 11.5 - 16.0 g/dL    HCT 20.1 (L) 35.0 - 47.0 %    MCV 88.2 80.0 - 99.0 FL    MCH 29.4 26.0 - 34.0 PG    MCHC 33.3 30.0 - 36.5 g/dL    RDW 15.0 (H) 11.5 - 14.5 %    PLATELET 342 355 - 870 K/uL    NEUTROPHILS 73 32 - 75 %    LYMPHOCYTES 15 12 - 49 %    MONOCYTES 9 5 - 13 %    EOSINOPHILS 3 0 - 7 %    BASOPHILS 0 0 - 1 %    ABS. NEUTROPHILS 7.1 1.8 - 8.0 K/UL    ABS. LYMPHOCYTES 1.5 0.8 - 3.5 K/UL    ABS. MONOCYTES 0.9 0.0 - 1.0 K/UL    ABS. EOSINOPHILS 0.3 0.0 - 0.4 K/UL    ABS.  BASOPHILS 0.0 0.0 - 0.1 K/UL    DF SMEAR SCANNED      RBC COMMENTS NORMOCYTIC, NORMOCHROMIC         Data Review images and reports reviewed    Assessment:     Principal Problem:    Acute calculous cholecystitis (2017)    Active Problems:    S/P cholecystectomy (11/15/2017)        Plan/Recommendations/Medical Decision Making:     Continue present treatment   Pt looks remarkably good following 3 liter blood loss during routine lap cholecystectomy converted to open laparotomy and control of bleeding. Hb drifted down to 6.7 following massive blood loss and 4 units transfusion. Asymptomatic. Advance to full liquids, add miralax  Continue zosyn today  Ambulate in [facility] PRAVEEN Angel MD, [facility]

## 2017-11-18 NOTE — PROGRESS NOTES
General Surgery End of Shift Nursing Note    Bedside shift change report given to Katie (oncoming nurse) by Roosevelt Cooley (offgoing nurse). Report included the following information SBAR, Kardex, Procedure Summary and Recent Results. Shift worked:      Summary of shift:       Issues for physician to address:        Number times ambulated in hallway past shift: none    Number of times OOB to chair past shift: 1    Pain Management:  Current medication:   Patient states pain is manageable on current pain medication: YES    GI:    Current diet:  DIET CLEAR LIQUID    Tolerating current diet: YES  Passing flatus: NO  Last Bowel Movement: several days ago   Appearance:     Respiratory:    Incentive Spirometer at bedside: YES  Patient instructed on use: YES    Patient Safety:    Falls Score: 3  Bed Alarm On? No  Sitter?  No    Desire Parish RN

## 2017-11-18 NOTE — PROGRESS NOTES
Nutrition Screen  LOS: 4    Assessment:  Patient screened per protocol; at high to moderate nutrition risk. RD to complete full assessment in 2-4 days. Diet Order: Full liquids  % Eaten:  No data found. Past Medical History:   Diagnosis Date    Psychiatric disorder     bipolar       Wt Readings from Last 30 Encounters:   11/14/17 70.7 kg (155 lb 13.8 oz)   11/12/14 75.8 kg (167 lb)   11/05/14 75.8 kg (167 lb)   06/04/14 77.1 kg (170 lb)       Anthropometrics:   Height: 5' 5\" (165.1 cm) Weight: 70.7 kg (155 lb 13.8 oz)     BMI: Body mass index is 25.94 kg/(m^2).     This BMI is indicative of:   []Underweight    []Normal    [x]Overweight    [] Obesity   [] Extreme Obesity (BMI>40)       Hortensia Lopez, [address] [place] [address]  Pager: [phone]

## 2017-11-19 VITALS
BODY MASS INDEX: 25.97 KG/M2 | HEART RATE: 80 BPM | WEIGHT: 155.87 LBS | TEMPERATURE: 97.8 F | SYSTOLIC BLOOD PRESSURE: 116 MMHG | OXYGEN SATURATION: 99 % | HEIGHT: 65 IN | RESPIRATION RATE: 18 BRPM | DIASTOLIC BLOOD PRESSURE: 74 MMHG

## 2017-11-19 LAB
ABO + RH BLD: NORMAL
BLD PROD TYP BPU: NORMAL
BLOOD GROUP ANTIBODIES SERPL: NORMAL
BPU ID: NORMAL
CROSSMATCH RESULT,%XM: NORMAL
ERYTHROCYTE [DISTWIDTH] IN BLOOD BY AUTOMATED COUNT: 15.5 % (ref 11.5–14.5)
HCT VFR BLD AUTO: 25.9 % (ref 35–47)
HGB BLD-MCNC: 8.4 G/DL (ref 11.5–16)
MCH RBC QN AUTO: 28.2 PG (ref 26–34)
MCHC RBC AUTO-ENTMCNC: 32.4 G/DL (ref 30–36.5)
MCV RBC AUTO: 86.9 FL (ref 80–99)
PLATELET # BLD AUTO: 241 K/UL (ref 150–400)
RBC # BLD AUTO: 2.98 M/UL (ref 3.8–5.2)
SPECIMEN EXP DATE BLD: NORMAL
STATUS OF UNIT,%ST: NORMAL
UNIT DIVISION, %UDIV: 0
WBC # BLD AUTO: 10.3 K/UL (ref 3.6–11)

## 2017-11-19 PROCEDURE — 36415 COLL VENOUS BLD VENIPUNCTURE: CPT | Performed by: SURGERY

## 2017-11-19 PROCEDURE — 74011250637 HC RX REV CODE- 250/637: Performed by: SURGERY

## 2017-11-19 PROCEDURE — 90471 IMMUNIZATION ADMIN: CPT

## 2017-11-19 PROCEDURE — 85027 COMPLETE CBC AUTOMATED: CPT | Performed by: SURGERY

## 2017-11-19 PROCEDURE — 74011000258 HC RX REV CODE- 258: Performed by: SURGERY

## 2017-11-19 PROCEDURE — 74011250636 HC RX REV CODE- 250/636: Performed by: INTERNAL MEDICINE

## 2017-11-19 PROCEDURE — 74011250636 HC RX REV CODE- 250/636: Performed by: SURGERY

## 2017-11-19 RX ORDER — HYDROCODONE BITARTRATE AND ACETAMINOPHEN 5; 325 MG/1; MG/1
1-2 TABLET ORAL
Qty: 30 TAB | Refills: 0 | Status: SHIPPED | OUTPATIENT
Start: 2017-11-19 | End: 2021-07-28

## 2017-11-19 RX ORDER — LITHIUM CARBONATE 300 MG/1
600 CAPSULE ORAL DAILY
Status: CANCELLED | OUTPATIENT
Start: 2017-11-20

## 2017-11-19 RX ORDER — HYDROCODONE BITARTRATE AND ACETAMINOPHEN 5; 325 MG/1; MG/1
1-2 TABLET ORAL
Status: DISCONTINUED | OUTPATIENT
Start: 2017-11-19 | End: 2017-11-19 | Stop reason: HOSPADM

## 2017-11-19 RX ADMIN — PIPERACILLIN SODIUM,TAZOBACTAM SODIUM 3.38 G: 3; .375 INJECTION, POWDER, FOR SOLUTION INTRAVENOUS at 05:58

## 2017-11-19 RX ADMIN — PANTOPRAZOLE SODIUM 40 MG: 40 TABLET, DELAYED RELEASE ORAL at 09:17

## 2017-11-19 RX ADMIN — HYDROCODONE BITARTRATE AND ACETAMINOPHEN 1 TABLET: 5; 325 TABLET ORAL at 12:17

## 2017-11-19 RX ADMIN — POLYETHYLENE GLYCOL 3350 17 G: 17 POWDER, FOR SOLUTION ORAL at 09:13

## 2017-11-19 RX ADMIN — PANTOPRAZOLE SODIUM 40 MG: 40 TABLET, DELAYED RELEASE ORAL at 05:58

## 2017-11-19 RX ADMIN — SODIUM CHLORIDE, SODIUM LACTATE, POTASSIUM CHLORIDE, AND CALCIUM CHLORIDE 125 ML/HR: 600; 310; 30; 20 INJECTION, SOLUTION INTRAVENOUS at 06:01

## 2017-11-19 NOTE — DISCHARGE SUMMARY
Physician Discharge Summary     Patient ID:  Ayse Alcaraz  [id number]  57 y.o.  1970    Admit Date: 11/14/2017    Discharge Date: 11/19/2017    Admission Diagnoses: Cholecystitis;S/P cholecystectomy    Discharge Diagnoses:  Principal Problem:    Acute calculous cholecystitis (11/14/2017)    Active Problems:    S/P cholecystectomy (11/15/2017)         Admission Condition: Poor    Discharge Condition: Good    Last Procedure: Procedure(s):  CHOLECYSTECTOMY LAPAROSCOPIC CONVERTED TO OPEN AT [address] Course:   Pt admitted with acute cholecystitis, underwent cholecystectomy with complication of massive life threatening perioperative bleeding from aberrant vessel requiring open procedure and transfusion 5 units prbc during hospitalization. She has done remarkably well and is now ready for d/c home, michael po and having bowel function. Consults: None    Disposition: home    Patient Instructions:   Current Discharge Medication List      START taking these medications    Details   HYDROcodone-acetaminophen (NORCO) 5-325 mg per tablet Take 1-2 Tabs by mouth every four (4) hours as needed for Pain. Max Daily Amount: 12 Tabs. Qty: 30 Tab, Refills: 0         CONTINUE these medications which have NOT CHANGED    Details   lithium carbonate 600 mg capsule Take 600 mg by mouth daily. bismuth subsalicylate (PEPTO-BISMOL) 262 mg/15 mL suspension Take  by mouth every four (4) hours as needed (upset stomach). Patient stated that she has been 'chugging' this agent out of the bottle. dextroamphetamine-amphetamine (ADDERALL) 20 mg tablet Take 20 mg by mouth two (2) times a day. zolpidem (AMBIEN) 5 mg tablet Take 5 mg by mouth nightly as needed for Sleep. Cetirizine (ZYRTEC) 10 mg cap Take 10 mg by mouth nightly. lansoprazole (PREVACID) 30 mg capsule Take 30 mg by mouth nightly as needed (GERD). rOPINIRole (REQUIP) 1 mg tablet Take 1 mg by mouth nightly.            Activity: No driving while on analgesics and No heavy lifting for 6 weeks  Diet: Low fat, Low cholesterol  Wound Care: Keep wound clean and dry    Follow-up with Dr. Beverly Bustamante in 2 weeks.   Follow-up tests/labs none    Signed:  Yaya Taylor MD  [facility]  11/19/2017  12:21 PM

## 2017-11-19 NOTE — PROGRESS NOTES
CM reviewed chart. CM updated AVS for follow up appointment    No further CM needs.      Génesis Peters, [address]   Ext [phone]

## 2017-11-19 NOTE — DISCHARGE INSTRUCTIONS
Cholecystectomy: What to Expect at [address] [place] [address]  After your surgery, it is normal to feel weak and tired for several days after you return home. Your belly may be swollen. If you had laparoscopic surgery, you may also have pain in your shoulder for about 24 hours. You may have gas or need to burp a lot at first, and a few people get diarrhea. The diarrhea usually goes away in 2 to 4 weeks, but it may last longer. How quickly you recover depends on whether you had a laparoscopic or open surgery. · For a laparoscopic surgery, most people can go back to work or their normal routine in 1 to 2 weeks, but it may take longer, depending on the type of work you do. · For an open surgery, it will probably take 4 to 6 weeks before you get back to your normal routine. This care sheet gives you a general idea about how long it will take for you to recover. However, each person recovers at a different pace. Follow the steps below to get better as quickly as possible. How can you care for yourself at home? Activity  · Rest when you feel tired. Getting enough sleep will help you recover. · Try to walk each day. Start out by walking a little more than you did the day before. Gradually increase the amount you walk. Walking boosts blood flow and helps prevent pneumonia and constipation. · For about 2 to 4 weeks, avoid lifting anything that would make you strain. This may include a child, heavy grocery bags and milk containers, a heavy briefcase or backpack, cat litter or dog food bags, or a vacuum . · Avoid strenuous activities, such as biking, jogging, weightlifting, and aerobic exercise, until your doctor says it is okay. · You may shower 24 to 48 hours after surgery, if your doctor okays it. Pat the cut (incision) dry. Do not take a bath for the first 2 weeks, or until your doctor tells you it is okay.   · You may drive when you are no longer taking pain medicine and can quickly move your foot from the gas pedal to the brake. You must also be able to sit comfortably for a long period of time, even if you do not plan to go far. You might get caught in traffic. · For a laparoscopic surgery, most people can go back to work or their normal routine in 1 to 2 weeks, but it may take longer. For an open surgery, it will probably take 4 to 6 weeks before you get back to your normal routine. · Your doctor will tell you when you can have sex again. Diet  · Eat smaller meals more often instead of fewer larger meals. You can eat a normal diet, but avoid eating fatty foods for about 1 month. Fatty foods include hamburger, whole milk, cheese, and many snack foods. If your stomach is upset, try bland, low-fat foods like plain rice, broiled chicken, toast, and yogurt. · Drink plenty of fluids (unless your doctor tells you not to). · If you have diarrhea, try avoiding spicy foods, dairy products, fatty foods, and alcohol. You can also watch to see if specific foods cause it, and stop eating them. If the diarrhea continues for more than 2 weeks, talk to your doctor. · You may notice that your bowel movements are not regular right after your surgery. This is common. Try to avoid constipation and straining with bowel movements. You may want to take a fiber supplement every day. If you have not had a bowel movement after a couple of days, ask your doctor about taking a mild laxative. Medicines  · Take pain medicines exactly as directed. ¨ If the doctor gave you a prescription medicine for pain, take it as prescribed. ¨ If you are not taking a prescription pain medicine, take an over-the-counter medicine such as acetaminophen (Tylenol), ibuprofen (Advil, Motrin), or naproxen (Aleve). Read and follow all instructions on the label. ¨ Do not take two or more pain medicines at the same time unless the doctor told you to. Many pain medicines contain acetaminophen, which is Tylenol. Too much Tylenol can be harmful.   · If you think your pain medicine is making you sick to your stomach:  ¨ Take your medicine after meals (unless your doctor tells you not to). ¨ Ask your doctor for a different pain medicine. · If your doctor prescribed antibiotics, take them as directed. Do not stop taking them just because you feel better. You need to take the full course of antibiotics. Incision care  · If you have strips of tape on the incision, or cut, leave the tape on for a week or until it falls off. · After 24 to 48 hours, wash the area daily with warm, soapy water, and pat it dry. · You may have staples to hold the cut together. Keep them dry until your doctor takes them out. This is usually in 7 to 10 days. · Keep the area clean and dry. You may cover it with a gauze bandage if it weeps or rubs against clothing. Change the bandage every day. Ice   · To reduce swelling and pain, put ice or a cold pack on your belly for 10 to 20 minutes at a time. Do this every 1 to 2 hours. Put a thin cloth between the ice and your skin. Follow-up care is a key part of your treatment and safety. Be sure to make and go to all appointments, and call your doctor if you are having problems. Its also a good idea to know your test results and keep a list of the medicines you take. When should you call for help? Call 911 anytime you think you may need emergency care. For example, call if:  · You passed out (lost consciousness). · You have severe trouble breathing. · You have sudden chest pain and shortness of breath, or you cough up blood. Call your doctor now or seek immediate medical care if:  · You are sick to your stomach and cannot drink fluids. · You have pain that does not get better when you take your pain medicine. · You have signs of infection, such as:  ¨ Increased pain, swelling, warmth, or redness. ¨ Red streaks leading from the incision. ¨ Pus draining from the incision. ¨ Swollen lymph nodes in your neck, armpits, or groin. ¨ A fever.   · Your urine turns dark brown or your stool is light-colored or sue-colored. · Your skin or the whites of your eyes turn yellow. · Bright red blood has soaked through a large bandage over your incision. · You have signs of a blood clot, such as:  ¨ Pain in your calf, back of knee, thigh, or groin. ¨ Redness and swelling in your leg or groin. · You have trouble passing urine or stool, especially if you have mild pain or swelling in your lower belly. Watch closely for any changes in your health, and be sure to contact your doctor if:  · You had a laparoscopic surgery and your shoulder pain lasts more than 24 hours. · You do not have a bowel movement after taking a laxative. Where can you learn more? Go to [employer]  Enter F357 in the search box to learn more about \"Cholecystectomy: What to Expect at Home. \"   © 8516-7891 [employer]. Care instructions adapted under license by [employer] (which disclaims liability or warranty for this information). This care instruction is for use with your licensed healthcare professional. If you have questions about a medical condition or this instruction, always ask your healthcare professional. [place] [address] any warranty or liability for your use of this information.   Content Version: 73.8.750750; Current as of: November 14, 2014

## 2017-11-19 NOTE — PROGRESS NOTES
General Surgery End of Shift Nursing Note    Bedside shift change report given to ANDRES (oncoming nurse) by Trinity Norris  (offgoing nurse). Report included the following information SBAR. Shift worked:   4039-1074   Summary of shift:  PATIENT RECEIVED 1UNIT PRBC TODAY. PATIENT NEEDS ENCOURAGEMENT TO WALK.      Issues for physician to address:      GI:    Current diet:  DIET FULL LIQUID    Tolerating current diet: YES  Passing flatus: NO  Last Bowel Movement: several days ago     Hoep Dietz RN

## 2017-11-19 NOTE — PROGRESS NOTES
Patient was anointed with the Sunny Kirk in the Gen Surg unit on 11/16/17 by Rev. Mahesh Silva.   Documented by EDWAR Ga, [facility], hammad GAXIOLA [facility] Paging Service  [phone])

## 2017-11-19 NOTE — PROGRESS NOTES
General Surgery End of Shift Nursing Note    Bedside shift change report given to Galilea(oncoming nurse) by Babak Cannon RN  (offgoing nurse). Report included the following information SBAR. Shift worked:  [id number]   Summary of shift:  No issues ambulating just fine.     Issues for physician to address:      GI:    Current diet:  DIET FULL LIQUID    Tolerating current diet: YES  Passing flatus: NO  Last Bowel Movement: several days ago     [facility]

## 2017-11-19 NOTE — PROGRESS NOTES
D/C'D HOME    PCA D/C'D IN THE AM    2 JOHN DRAINS REMOVED    IV REMOVED    PRESCRIPTION GIVEN & INSTRUCTIONS PROVIDED, EXPLAINED, AND QUESTIONS WERE ANSWERED    REFUSED FLU VACCINE    Nikki Virgen RN

## 2017-11-24 ENCOUNTER — TELEPHONE (OUTPATIENT)
Dept: SURGERY | Age: 47
End: 2017-11-24

## 2017-11-24 NOTE — TELEPHONE ENCOUNTER
Pt requesting new pain med. Tylenol #3 not relieving pain,otherwise doing ok, except for some bilateral leg ankle edema. S/p open noel and repair of massive operative bleeding on 11/16. Insructed pt to try taking over the counter ibuprofen, can take up to 3tabs = 600mg q6hrs. Do not take on an empty stomach. Continue to take Tylenol #3 as needed 1-2 tabs q4hrs. If pain no better call the office back. Elevate legs while resting. If there is any sob or chest pain go to er immediately. Opportunity for questions with clarification.

## 2017-12-01 ENCOUNTER — OFFICE VISIT (OUTPATIENT)
Dept: SURGERY | Age: 47
End: 2017-12-01

## 2017-12-01 VITALS
WEIGHT: 148.4 LBS | HEART RATE: 121 BPM | OXYGEN SATURATION: 100 % | DIASTOLIC BLOOD PRESSURE: 88 MMHG | HEIGHT: 65 IN | TEMPERATURE: 97.8 F | RESPIRATION RATE: 20 BRPM | BODY MASS INDEX: 24.72 KG/M2 | SYSTOLIC BLOOD PRESSURE: 119 MMHG

## 2017-12-01 DIAGNOSIS — K80.00 ACUTE CALCULOUS CHOLECYSTITIS: Primary | ICD-10-CM

## 2017-12-01 NOTE — PROGRESS NOTES
SUBJECTIVE: Emiliana Longoria is a 56 y.o. female is seen for a routine postop check s/p open cholecystectomy with massive life threatening blood loss. Reports no problems with the wound or other issues. Activity, diet and bowels are normal. Still with incisional pain. OBJECTIVE: Appears well. Wounds are well healed without complications or infection. Staples removed    ASSESSMENT: normal postoperative course, doing well. PLAN: Patient is instructed to avoid heavy lifting for 4 more weeks. Return PRN for any problems or concerns.

## 2017-12-01 NOTE — MR AVS SNAPSHOT
Visit Information Date & Time Provider Department Dept. Phone Encounter #  
 12/1/2017  9:10 AM Karlos Garcia MD [facility] [phone] [id number] Your Appointments 12/1/2017  9:10 AM  
POST OP 10 MIN with Karlos Garcia MD  
[facility] ([address]) Appt Note: 11/15 gb  
 [facility] [address] [id number]  
[address] [address] Upcoming Health Maintenance Date Due DTaP/Tdap/Td series (1 - Tdap) 4/7/1992 PAP AKA CERVICAL CYTOLOGY 4/7/1992 Influenza Age 5 to Adult 8/1/2017 Allergies as of 12/1/2017  Review Complete On: 12/1/2017 By: Jenni Romero LPN Severity Noted Reaction Type Reactions Wellbutrin [Bupropion]  06/04/2014    Hives Current Immunizations  Never Reviewed Name Date Influenza Vaccine 11/13/2014 10:53 AM  
 Pneumococcal Polysaccharide (PPSV-23) 11/13/2014 10:51 AM  
  
 Not reviewed this visit You Were Diagnosed With   
  
 Codes Comments Acute calculous cholecystitis    -  Primary ICD-10-CM: K80.00 ICD-9-CM: 574.00 Vitals BP Pulse Temp Resp Height(growth percentile) Weight(growth percentile) 119/88 (BP 1 Location: Right arm, BP Patient Position: Sitting) (!) 121 97.8 °F (36.6 °C) (Oral) 20 5' 5\" (1.651 m) 148 lb 6.4 oz (67.3 kg) SpO2 BMI Smoking Status 100% 24.7 kg/m2 Current Every Day Smoker BMI and BSA Data Body Mass Index Body Surface Area 24.7 kg/m 2 1.76 m 2 Preferred Pharmacy Pharmacy Name Phone [address], [address] [place] [address] File [phone] Your Updated Medication List  
  
   
This list is accurate as of: 12/1/17  9:08 AM.  Always use your most recent med list.  
  
  
  
  
 ADDERALL 20 mg tablet Generic drug:  dextroamphetamine-amphetamine Take 20 mg by mouth two (2) times a day. bismuth subsalicylate 138 AJ/94 mL suspension Commonly known as:  PEPTO-BISMOL Take  by mouth every four (4) hours as needed (upset stomach). Patient stated that she has been 'chugging' this agent out of the bottle. HYDROcodone-acetaminophen 5-325 mg per tablet Commonly known as:  Viva Leigh Take 1-2 Tabs by mouth every four (4) hours as needed for Pain. Max Daily Amount: 12 Tabs. lithium carbonate 600 mg capsule Take 600 mg by mouth daily. PREVACID 30 mg capsule Generic drug:  lansoprazole Take 30 mg by mouth nightly as needed (GERD). REQUIP 1 mg tablet Generic drug:  rOPINIRole Take 1 mg by mouth nightly. zolpidem 5 mg tablet Commonly known as:  AMBIEN Take 5 mg by mouth nightly as needed for Sleep. ZyrTEC 10 mg Cap Generic drug:  Cetirizine Take 10 mg by mouth nightly. Introducing [facility]! Amber Aguero introduces "[employer] patient portal. Now you can access parts of your medical record, email your doctor's office, and request medication refills online. 1. In your internet browser, go to [email or web address] 2. Click on the First Time User? Click Here link in the Sign In box. You will see the New Member Sign Up page. 3. Enter your "[employer] Access Code exactly as it appears below. You will not need to use this code after youve completed the sign-up process. If you do not sign up before the expiration date, you must request a new code. · "[employer] Access Code: [email or web address] Expires: 2/12/2018  8:01 AM 
 
4. Enter the last four digits of your Social Security Number (xxxx) and Date of Birth (mm/dd/yyyy) as indicated and click Submit. You will be taken to the next sign-up page. 5. Create a "[employer] ID. This will be your "[employer] login ID and cannot be changed, so think of one that is secure and easy to remember. 6. Create a [employer] password. You can change your password at any time. 7. Enter your Password Reset Question and Answer. This can be used at a later time if you forget your password. 8. Enter your e-mail address. You will receive e-mail notification when new information is available in [address] 9. Click Sign Up. You can now view and download portions of your medical record. 10. Click the Download Summary menu link to download a portable copy of your medical information. If you have questions, please visit the Frequently Asked Questions section of the [employer] website. Remember, [employer] is NOT to be used for urgent needs. For medical emergencies, dial 911. Now available from your iPhone and Android! Please provide this summary of care documentation to your next provider. Your primary care clinician is listed as Rodrigue Buckner. If you have any questions after today's visit, please call [phone].

## 2021-07-16 ENCOUNTER — TRANSCRIBE ORDER (OUTPATIENT)
Dept: REGISTRATION | Age: 51
End: 2021-07-16

## 2021-07-16 ENCOUNTER — HOSPITAL ENCOUNTER (OUTPATIENT)
Dept: PREADMISSION TESTING | Age: 51
Discharge: HOME | End: 2021-07-16
Payer: COMMERCIAL

## 2021-07-16 ENCOUNTER — HOSPITAL ENCOUNTER (OUTPATIENT)
Dept: LAB | Age: 51
Discharge: HOME | End: 2021-07-16

## 2021-07-16 DIAGNOSIS — Z01.818 PREOPERATIVE EXAMINATION, UNSPECIFIED: Primary | ICD-10-CM

## 2021-07-16 DIAGNOSIS — Z01.818 PREOPERATIVE EXAMINATION, UNSPECIFIED: ICD-10-CM

## 2021-07-16 LAB — SENTARA SPECIMEN COL,SENBCF: NORMAL

## 2021-07-16 PROCEDURE — 93005 ELECTROCARDIOGRAM TRACING: CPT

## 2021-07-16 PROCEDURE — 99001 SPECIMEN HANDLING PT-LAB: CPT

## 2021-07-18 LAB
ATRIAL RATE: 91 BPM
CALCULATED P AXIS, ECG09: 66 DEGREES
CALCULATED R AXIS, ECG10: 58 DEGREES
CALCULATED T AXIS, ECG11: 68 DEGREES
DIAGNOSIS, 93000: NORMAL
P-R INTERVAL, ECG05: 138 MS
Q-T INTERVAL, ECG07: 362 MS
QRS DURATION, ECG06: 72 MS
QTC CALCULATION (BEZET), ECG08: 445 MS
VENTRICULAR RATE, ECG03: 91 BPM

## 2021-07-19 PROBLEM — M51.26 HNP (HERNIATED NUCLEUS PULPOSUS), LUMBAR: Status: ACTIVE | Noted: 2021-07-19

## 2021-07-19 NOTE — H&P
Patient Name:  Ian Diamond   YOB: 1970    Chief Complaint:  Lower back pain. History of Chief Complaint:  Ms. Faustino Ndiaye is a 60-year-old female who is being seen for lower back pain. She has been having problems with her lower back. She had to move her house at the end of March, and she began having significant pain across the lower back, mainly on the right side. She saw Dr. Evelyn Dykes for chiropractic treatment, which has been helping somewhat with her lower back. Over the past week it has been getting much worse. She says even vacuuming causes worsening pain. Bending slightly forward causes worsening pain. Standing, sitting, lying down, and bending forward all make the pain worse. She went to [facility] and was placed on a Medrol Dosepak and muscle relaxers. She still has soreness in the buttocks and hips. She has had no physical therapy or injections. She says the Medrol Dosepak did affect her Lithium. The Flexeril seemed to help. She has taken Motrin four pills every three hours which did not seem to help. Past Medical/Surgical History:    Disease/Disorder Date Side Surgery Date Side Comment   Anxiety         Bipolar disorder         Depression            Cholecystectomy 2018        Hysterectomy 2013       Allergies:    Ingredient Reaction Medication Name Comment   BUPROPION HCL Rash Wellbutrin      Current Medications:    Medication Directions   Ambien 10 mg tablet    cetirizine 10 mg tablet    lithium carbonate  mg tablet,extended release    Nexium 20 mg capsule,delayed release    ropinirole 2 mg tablet      Social History:      SMOKING  Status Tobacco Type Units Per Day Yrs Used   Former smoker Cigarette       ALCOHOL  There is a history of alcohol use. Type: Vodka. 1 drink consumed daily.     Family History:    Disease Detail Family Member Age Cause of Death Comments   Arthritis Mother  N    Arthritis Father  N    Cancer Father  N    Problems with anesthesia Mother N      Review of Systems:    GENERAL:  Patient has no signs of fever, chills or weight change. HEAD/ENTM:  Patient has no signs of headaches, dizziness, hearing loss, ringing in ears, sore throat, recent cold, double vision, blurred vision, itchy eyes, eye redness or eye discharge. NEUROLOGIC: Patient presents with muscle weakness and numbness/tingling. Patient has no signs of fainting, loss of balance or seizure disorder. CARDIOVASCULAR:  Patient has no signs of chest pain, palpitations, rheumatic fever or heart murmur. RESPIRATORY:  Patient has no signs of chronic cough, wheezing, difficulty breathing, pain on breathing or shortness of breath. GASTROINTESTINAL:  Patient has no signs of nausea/vomiting, difficulty swallowing, gas/bloating, indigestion, abdominal pain, diarrhea, bloody stools or hemorrhoids. GENITOURINARY:  Patient has no signs of blood in the urine, painful urinating, burning sensation, bladder/kidney infection, frequent urinating or incontinence. MUSCULOSKELETAL: Patient presents with swelling of feet. Patient has no signs of fracture/dislocation, sprain/strain, tendonitis, joint stiffness, joint pain, rheumatoid disease or gout. INTEGUMENTARY:  Patient has no signs of rash/itching, psoriasis, Raynaud's phenomenon or varicose veins. EMOTIONAL: Patient presents with change in mood. Patient has no signs of anxiety, depression, bipolar disorder or memory loss. Vitals:  Date BP Pulse Temp (F) Resp. (per min.) Height (Total in.) Weight (lbs.) BMI   06/24/2021     65.00 154.00 25.63     Physical Examination:    General:  The patient appears healthy. Cardiovascular:  Arterial pulses are normal.  Skin:  The skin is normal appearing with no contusions or wounds noted. Heart- RRR  Lungs-CTA mitzi  Abdomen- +BS,soft,nontender  Musculoskeletal:  There is tenderness around the right PSIS. The thoracolumbar spine has normal kyphosis, a normal appearance, and no scoliosis.   There is full range of motion of the cervical, thoracic, and lumbar spine and of the hips, knees, and ankles. Straight leg raising and crossed straight leg raising tests are negative. Neurological:  There is no weakness in the thoracic, lumbar, or sacral spine or in the lower extremities or hips. Deep tendon reflexes are present and normal bilaterally. Ankle and knee jerks are normal with no clonus. Radiograph Examination:  AP, lateral, bilateral oblique, flexion and extension views of the lumbar spine were obtained and interpreted in the office 6/24/2021and reveal L5-S1 degenerative disc disease. An AP view of the pelvis was obtained and interpreted in the office  and reveals no periosteal reaction, no medullary lesions, no osteopenia, well-aligned joint spaces, no chondrolysis, and no fractures. MRI scan lumbar spine [facility] 7/6/2021 Large right-sided L5-S1 disc herniation. Impression:   Large right-sided L5-S1 disc herniation with right lower extremity radiculopathy. Plan: I reviewed Ms. Fernandez's MRI with her. She has a large right-sided L5-S1 disc herniation. I discussed the treatments for the condition including surgical intervention, the risks, and benefits as well as the different surgical approaches and decision making. We also discussed nonsurgical care for this condition including medications, injections, physical therapy, rehabilitation, activity modification, and brace utilization. At this point, with changes neurologically that is worsening and difficulty doing her activities of daily living, she would like to proceed to operative intervention and planning for right-sided L5-S1 microdiscectomy. The risks versus the benefits as well as the alternatives were fully explained to the patient.   Risks include, but are not limited to, paralysis, death, heart attack, stroke, pulmonary embolism, deep vein thrombosis, infection, failure to relieve pain, increase in back or leg pain, reherniation of disc material, need for revision surgery, scarring, spinal fluid leak, bowel or bladder dysfunction, disease transmission, chronic graft site pain, instrumentation failure, pseudarthrosis, and the need for chronic ambulatory assist devices. The patient states full understanding of the risks and benefits and wishes to proceed.

## 2021-07-22 ENCOUNTER — HOSPITAL ENCOUNTER (OUTPATIENT)
Dept: PREADMISSION TESTING | Age: 51
Discharge: HOME | End: 2021-07-22
Payer: COMMERCIAL

## 2021-07-22 PROCEDURE — U0003 INFECTIOUS AGENT DETECTION BY NUCLEIC ACID (DNA OR RNA); SEVERE ACUTE RESPIRATORY SYNDROME CORONAVIRUS 2 (SARS-COV-2) (CORONAVIRUS DISEASE [COVID-19]), AMPLIFIED PROBE TECHNIQUE, MAKING USE OF HIGH THROUGHPUT TECHNOLOGIES AS DESCRIBED BY CMS-2020-01-R: HCPCS

## 2021-07-23 LAB — SARS-COV-2, COV2NT: NOT DETECTED

## 2021-07-26 NOTE — NURSE NAVIGATOR
Perry Fowler watched the preoperative spine seminar online and received a preoperative spine education book in anticipation of surgery.      Orthopaedic Nurse Navigator

## 2021-07-27 ENCOUNTER — ANESTHESIA EVENT (OUTPATIENT)
Dept: SURGERY | Age: 51
End: 2021-07-27
Payer: COMMERCIAL

## 2021-07-27 NOTE — ANESTHESIA PREPROCEDURE EVALUATION
Relevant Problems   No relevant active problems       Anesthetic History        Pertinent negatives: No PONV       Review of Systems / Medical History  Patient summary reviewed, nursing notes reviewed and pertinent labs reviewed    Pulmonary          Smoker (current, smoked this morning)    Pertinent negatives: No asthma and sleep apnea     Neuro/Psych         Psychiatric history (bipolar, ADD)  Pertinent negatives: No seizures and CVA   Cardiovascular                Pertinent negatives: No hypertension, past MI and angina  Exercise tolerance: >4 METS     GI/Hepatic/Renal     GERD: well controlled        Pertinent negatives: No liver disease and renal disease  Comments: Social ETOH Endo/Other        Arthritis  Pertinent negatives: No diabetes and obesity   Other Findings            Physical Exam    Airway  Mallampati: II  TM Distance: 4 - 6 cm  Neck ROM: normal range of motion   Mouth opening: Normal     Cardiovascular    Rhythm: regular  Rate: normal         Dental  No notable dental hx       Pulmonary  Breath sounds clear to auscultation               Abdominal  GI exam deferred       Other Findings            Anesthetic Plan    ASA: 2  Anesthesia type: general          Induction: Intravenous  Anesthetic plan and risks discussed with: Patient      Plan general endotracheal anesthesia with prone positioning for planned procedure. Patient aware of and accepts risks of general anesthesia as discussed and outlined on the anesthesia consent form. In addition, spinal surgery in the prone position is associated with the rare risk of post-operative visual loss that may be transient or permanent (4:500,000 cases, or .0008%). Patient is at increased risk if patient is male, obese, diabetic, surgeon uses Lysbeth Peasant frame, and/or patient experiences significant blood loss or long surgical time. Patient is aware of and accepts all risks and wants to proceed with this elective surgical procedure.

## 2021-07-28 ENCOUNTER — APPOINTMENT (OUTPATIENT)
Dept: GENERAL RADIOLOGY | Age: 51
End: 2021-07-28
Attending: ORTHOPAEDIC SURGERY
Payer: COMMERCIAL

## 2021-07-28 ENCOUNTER — HOSPITAL ENCOUNTER (OUTPATIENT)
Age: 51
Discharge: HOME | End: 2021-07-28
Attending: ORTHOPAEDIC SURGERY | Admitting: ORTHOPAEDIC SURGERY
Payer: COMMERCIAL

## 2021-07-28 ENCOUNTER — ANESTHESIA (OUTPATIENT)
Dept: SURGERY | Age: 51
End: 2021-07-28
Payer: COMMERCIAL

## 2021-07-28 VITALS
HEART RATE: 60 BPM | HEIGHT: 65 IN | WEIGHT: 156.5 LBS | SYSTOLIC BLOOD PRESSURE: 117 MMHG | DIASTOLIC BLOOD PRESSURE: 67 MMHG | BODY MASS INDEX: 26.08 KG/M2 | OXYGEN SATURATION: 100 % | RESPIRATION RATE: 16 BRPM | TEMPERATURE: 96.5 F

## 2021-07-28 DIAGNOSIS — M51.26 HNP (HERNIATED NUCLEUS PULPOSUS), LUMBAR: Primary | ICD-10-CM

## 2021-07-28 PROCEDURE — 77030008683 HC TU ET CUF COVD -A: Performed by: ANESTHESIOLOGY

## 2021-07-28 PROCEDURE — 74011250636 HC RX REV CODE- 250/636: Performed by: ANESTHESIOLOGY

## 2021-07-28 PROCEDURE — 77030031139 HC SUT VCRL2 J&J -A: Performed by: ORTHOPAEDIC SURGERY

## 2021-07-28 PROCEDURE — 76210000021 HC REC RM PH II 0.5 TO 1 HR: Performed by: ORTHOPAEDIC SURGERY

## 2021-07-28 PROCEDURE — 77030010507 HC ADH SKN DERMBND J&J -B: Performed by: ORTHOPAEDIC SURGERY

## 2021-07-28 PROCEDURE — 77030003029 HC SUT VCRL J&J -B: Performed by: ORTHOPAEDIC SURGERY

## 2021-07-28 PROCEDURE — 74011250636 HC RX REV CODE- 250/636: Performed by: NURSE ANESTHETIST, CERTIFIED REGISTERED

## 2021-07-28 PROCEDURE — 76210000016 HC OR PH I REC 1 TO 1.5 HR: Performed by: ORTHOPAEDIC SURGERY

## 2021-07-28 PROCEDURE — 74011250637 HC RX REV CODE- 250/637: Performed by: ANESTHESIOLOGY

## 2021-07-28 PROCEDURE — 77010033678 HC OXYGEN DAILY

## 2021-07-28 PROCEDURE — 77030006643: Performed by: ANESTHESIOLOGY

## 2021-07-28 PROCEDURE — 77030028271 HC SRGFL HEMSTAT MTRX KT J&J -C: Performed by: ORTHOPAEDIC SURGERY

## 2021-07-28 PROCEDURE — 77030027521 HC SEAL BPLR AQMNTYS EVS MEDT -F: Performed by: ORTHOPAEDIC SURGERY

## 2021-07-28 PROCEDURE — 77030003666 HC NDL SPINAL BD -A: Performed by: ORTHOPAEDIC SURGERY

## 2021-07-28 PROCEDURE — 76060000033 HC ANESTHESIA 1 TO 1.5 HR: Performed by: ORTHOPAEDIC SURGERY

## 2021-07-28 PROCEDURE — 77030020782 HC GWN BAIR PAWS FLX 3M -B: Performed by: ORTHOPAEDIC SURGERY

## 2021-07-28 PROCEDURE — 74011000250 HC RX REV CODE- 250: Performed by: NURSE ANESTHETIST, CERTIFIED REGISTERED

## 2021-07-28 PROCEDURE — 74011250636 HC RX REV CODE- 250/636: Performed by: ORTHOPAEDIC SURGERY

## 2021-07-28 PROCEDURE — 74011000250 HC RX REV CODE- 250: Performed by: ORTHOPAEDIC SURGERY

## 2021-07-28 PROCEDURE — 74011000258 HC RX REV CODE- 258: Performed by: NURSE ANESTHETIST, CERTIFIED REGISTERED

## 2021-07-28 PROCEDURE — 77030002986 HC SUT PROL J&J -A: Performed by: ORTHOPAEDIC SURGERY

## 2021-07-28 PROCEDURE — 77030008477 HC STYL SATN SLP COVD -A: Performed by: ANESTHESIOLOGY

## 2021-07-28 PROCEDURE — 77030008462 HC STPLR SKN PROX J&J -A: Performed by: ORTHOPAEDIC SURGERY

## 2021-07-28 PROCEDURE — 2709999900 HC NON-CHARGEABLE SUPPLY: Performed by: ORTHOPAEDIC SURGERY

## 2021-07-28 PROCEDURE — 76010000149 HC OR TIME 1 TO 1.5 HR: Performed by: ORTHOPAEDIC SURGERY

## 2021-07-28 RX ORDER — LIDOCAINE HYDROCHLORIDE 20 MG/ML
INJECTION, SOLUTION EPIDURAL; INFILTRATION; INTRACAUDAL; PERINEURAL AS NEEDED
Status: DISCONTINUED | OUTPATIENT
Start: 2021-07-28 | End: 2021-07-28 | Stop reason: HOSPADM

## 2021-07-28 RX ORDER — ONDANSETRON 2 MG/ML
INJECTION INTRAMUSCULAR; INTRAVENOUS AS NEEDED
Status: DISCONTINUED | OUTPATIENT
Start: 2021-07-28 | End: 2021-07-28 | Stop reason: HOSPADM

## 2021-07-28 RX ORDER — FENTANYL CITRATE 50 UG/ML
50 INJECTION, SOLUTION INTRAMUSCULAR; INTRAVENOUS AS NEEDED
Status: DISCONTINUED | OUTPATIENT
Start: 2021-07-28 | End: 2021-07-28 | Stop reason: HOSPADM

## 2021-07-28 RX ORDER — SODIUM CHLORIDE 0.9 % (FLUSH) 0.9 %
5-40 SYRINGE (ML) INJECTION EVERY 8 HOURS
Status: DISCONTINUED | OUTPATIENT
Start: 2021-07-28 | End: 2021-07-28 | Stop reason: HOSPADM

## 2021-07-28 RX ORDER — FLUMAZENIL 0.1 MG/ML
0.2 INJECTION INTRAVENOUS
Status: DISCONTINUED | OUTPATIENT
Start: 2021-07-28 | End: 2021-07-28 | Stop reason: HOSPADM

## 2021-07-28 RX ORDER — GLYCOPYRROLATE 0.2 MG/ML
INJECTION INTRAMUSCULAR; INTRAVENOUS AS NEEDED
Status: DISCONTINUED | OUTPATIENT
Start: 2021-07-28 | End: 2021-07-28 | Stop reason: HOSPADM

## 2021-07-28 RX ORDER — MIDAZOLAM HYDROCHLORIDE 1 MG/ML
INJECTION, SOLUTION INTRAMUSCULAR; INTRAVENOUS AS NEEDED
Status: DISCONTINUED | OUTPATIENT
Start: 2021-07-28 | End: 2021-07-28 | Stop reason: HOSPADM

## 2021-07-28 RX ORDER — ROCURONIUM BROMIDE 10 MG/ML
INJECTION, SOLUTION INTRAVENOUS AS NEEDED
Status: DISCONTINUED | OUTPATIENT
Start: 2021-07-28 | End: 2021-07-28 | Stop reason: HOSPADM

## 2021-07-28 RX ORDER — DEXAMETHASONE SODIUM PHOSPHATE 4 MG/ML
INJECTION, SOLUTION INTRA-ARTICULAR; INTRALESIONAL; INTRAMUSCULAR; INTRAVENOUS; SOFT TISSUE AS NEEDED
Status: DISCONTINUED | OUTPATIENT
Start: 2021-07-28 | End: 2021-07-28 | Stop reason: HOSPADM

## 2021-07-28 RX ORDER — SODIUM CHLORIDE 0.9 % (FLUSH) 0.9 %
5-40 SYRINGE (ML) INJECTION AS NEEDED
Status: DISCONTINUED | OUTPATIENT
Start: 2021-07-28 | End: 2021-07-28 | Stop reason: HOSPADM

## 2021-07-28 RX ORDER — KETOROLAC TROMETHAMINE 15 MG/ML
INJECTION, SOLUTION INTRAMUSCULAR; INTRAVENOUS AS NEEDED
Status: DISCONTINUED | OUTPATIENT
Start: 2021-07-28 | End: 2021-07-28 | Stop reason: HOSPADM

## 2021-07-28 RX ORDER — PROPOFOL 10 MG/ML
INJECTION, EMULSION INTRAVENOUS AS NEEDED
Status: DISCONTINUED | OUTPATIENT
Start: 2021-07-28 | End: 2021-07-28 | Stop reason: HOSPADM

## 2021-07-28 RX ORDER — NALOXONE HYDROCHLORIDE 4 MG/.1ML
SPRAY NASAL
Qty: 1 EACH | Refills: 0 | Status: SHIPPED | OUTPATIENT
Start: 2021-07-28

## 2021-07-28 RX ORDER — CEFAZOLIN SODIUM/WATER 2 G/20 ML
2 SYRINGE (ML) INTRAVENOUS ONCE
Status: COMPLETED | OUTPATIENT
Start: 2021-07-28 | End: 2021-07-28

## 2021-07-28 RX ORDER — SODIUM CHLORIDE, SODIUM LACTATE, POTASSIUM CHLORIDE, CALCIUM CHLORIDE 600; 310; 30; 20 MG/100ML; MG/100ML; MG/100ML; MG/100ML
125 INJECTION, SOLUTION INTRAVENOUS CONTINUOUS
Status: DISCONTINUED | OUTPATIENT
Start: 2021-07-28 | End: 2021-07-28 | Stop reason: HOSPADM

## 2021-07-28 RX ORDER — HYDROMORPHONE HYDROCHLORIDE 1 MG/ML
INJECTION, SOLUTION INTRAMUSCULAR; INTRAVENOUS; SUBCUTANEOUS AS NEEDED
Status: DISCONTINUED | OUTPATIENT
Start: 2021-07-28 | End: 2021-07-28 | Stop reason: HOSPADM

## 2021-07-28 RX ORDER — HYDROMORPHONE HYDROCHLORIDE 1 MG/ML
0.5 INJECTION, SOLUTION INTRAMUSCULAR; INTRAVENOUS; SUBCUTANEOUS
Status: DISCONTINUED | OUTPATIENT
Start: 2021-07-28 | End: 2021-07-28 | Stop reason: HOSPADM

## 2021-07-28 RX ORDER — OXYCODONE AND ACETAMINOPHEN 5; 325 MG/1; MG/1
1-2 TABLET ORAL
Qty: 56 TABLET | Refills: 0 | Status: SHIPPED | OUTPATIENT
Start: 2021-07-28 | End: 2021-08-04

## 2021-07-28 RX ORDER — NALOXONE HYDROCHLORIDE 0.4 MG/ML
0.4 INJECTION, SOLUTION INTRAMUSCULAR; INTRAVENOUS; SUBCUTANEOUS AS NEEDED
Status: DISCONTINUED | OUTPATIENT
Start: 2021-07-28 | End: 2021-07-28 | Stop reason: HOSPADM

## 2021-07-28 RX ORDER — OXYCODONE HYDROCHLORIDE 5 MG/1
5 TABLET ORAL
Status: DISCONTINUED | OUTPATIENT
Start: 2021-07-28 | End: 2021-07-28 | Stop reason: HOSPADM

## 2021-07-28 RX ORDER — KETAMINE HYDROCHLORIDE 10 MG/ML
INJECTION, SOLUTION INTRAMUSCULAR; INTRAVENOUS AS NEEDED
Status: DISCONTINUED | OUTPATIENT
Start: 2021-07-28 | End: 2021-07-28 | Stop reason: HOSPADM

## 2021-07-28 RX ORDER — NEOSTIGMINE METHYLSULFATE 1 MG/ML
INJECTION, SOLUTION INTRAVENOUS AS NEEDED
Status: DISCONTINUED | OUTPATIENT
Start: 2021-07-28 | End: 2021-07-28 | Stop reason: HOSPADM

## 2021-07-28 RX ORDER — ACETAMINOPHEN 500 MG
1000 TABLET ORAL ONCE
Status: COMPLETED | OUTPATIENT
Start: 2021-07-28 | End: 2021-07-28

## 2021-07-28 RX ADMIN — CEFAZOLIN 2 G: 1 INJECTION, POWDER, FOR SOLUTION INTRAVENOUS at 08:26

## 2021-07-28 RX ADMIN — HYDROMORPHONE HYDROCHLORIDE 1 MG: 1 INJECTION, SOLUTION INTRAMUSCULAR; INTRAVENOUS; SUBCUTANEOUS at 08:08

## 2021-07-28 RX ADMIN — KETOROLAC TROMETHAMINE 30 MG: 15 INJECTION, SOLUTION INTRAMUSCULAR; INTRAVENOUS at 09:09

## 2021-07-28 RX ADMIN — GLYCOPYRROLATE 0.4 MG: 0.2 INJECTION INTRAMUSCULAR; INTRAVENOUS at 09:07

## 2021-07-28 RX ADMIN — MIDAZOLAM 2 MG: 1 INJECTION INTRAMUSCULAR; INTRAVENOUS at 08:08

## 2021-07-28 RX ADMIN — ROCURONIUM BROMIDE 50 MG: 10 INJECTION, SOLUTION INTRAVENOUS at 08:12

## 2021-07-28 RX ADMIN — GLYCOPYRROLATE 0.1 MG: 0.2 INJECTION INTRAMUSCULAR; INTRAVENOUS at 08:08

## 2021-07-28 RX ADMIN — KETAMINE HYDROCHLORIDE 10 MG: 10 INJECTION, SOLUTION INTRAMUSCULAR; INTRAVENOUS at 08:11

## 2021-07-28 RX ADMIN — DEXMEDETOMIDINE HYDROCHLORIDE 4 MCG: 100 INJECTION, SOLUTION INTRAVENOUS at 08:33

## 2021-07-28 RX ADMIN — FENTANYL CITRATE 25 MCG: 50 INJECTION, SOLUTION INTRAMUSCULAR; INTRAVENOUS at 09:43

## 2021-07-28 RX ADMIN — DEXMEDETOMIDINE HYDROCHLORIDE 4 MCG: 100 INJECTION, SOLUTION INTRAVENOUS at 08:54

## 2021-07-28 RX ADMIN — SODIUM CHLORIDE, SODIUM LACTATE, POTASSIUM CHLORIDE, AND CALCIUM CHLORIDE: 600; 310; 30; 20 INJECTION, SOLUTION INTRAVENOUS at 09:00

## 2021-07-28 RX ADMIN — SODIUM CHLORIDE, SODIUM LACTATE, POTASSIUM CHLORIDE, AND CALCIUM CHLORIDE 125 ML/HR: 600; 310; 30; 20 INJECTION, SOLUTION INTRAVENOUS at 06:59

## 2021-07-28 RX ADMIN — FENTANYL CITRATE 25 MCG: 50 INJECTION, SOLUTION INTRAMUSCULAR; INTRAVENOUS at 09:57

## 2021-07-28 RX ADMIN — LIDOCAINE HYDROCHLORIDE 60 MG: 20 INJECTION, SOLUTION INTRAVENOUS at 08:12

## 2021-07-28 RX ADMIN — Medication 3 MG: at 09:07

## 2021-07-28 RX ADMIN — DEXAMETHASONE SODIUM PHOSPHATE 4 MG: 4 INJECTION, SOLUTION INTRAMUSCULAR; INTRAVENOUS at 08:24

## 2021-07-28 RX ADMIN — PROPOFOL 150 MG: 10 INJECTION, EMULSION INTRAVENOUS at 08:12

## 2021-07-28 RX ADMIN — ACETAMINOPHEN 1000 MG: 500 TABLET ORAL at 07:01

## 2021-07-28 RX ADMIN — ONDANSETRON HYDROCHLORIDE 4 MG: 2 INJECTION INTRAMUSCULAR; INTRAVENOUS at 08:24

## 2021-07-28 RX ADMIN — KETAMINE HYDROCHLORIDE 40 MG: 10 INJECTION, SOLUTION INTRAMUSCULAR; INTRAVENOUS at 08:42

## 2021-07-28 NOTE — DISCHARGE INSTRUCTIONS
DISCHARGE SUMMARY from Nurse    PATIENT INSTRUCTIONS:    After general anesthesia or intravenous sedation, for 24 hours or while taking prescription Narcotics:  · Limit your activities  · Do not drive and operate hazardous machinery  · Do not make important personal or business decisions  · Do  not drink alcoholic beverages  · If you have not urinated within 8 hours after discharge, please contact your surgeon on call. Report the following to your surgeon:  · Excessive pain, swelling, redness or odor of or around the surgical area  · Temperature over 100.5  · Nausea and vomiting lasting longer than 4 hours or if unable to take medications  · Any signs of decreased circulation or nerve impairment to extremity: change in color, persistent  numbness, tingling, coldness or increase pain  · Any questions    What to do at Home:  Ifeanyi Tabor IN 10 DAYS CALL FOR APPT    If you experience any of the following symptoms heavy bleeding, fevers, severe pain, circulation changes, please follow up with dr Suhas Howe    *  Please give a list of your current medications to your Primary Care Provider. *  Please update this list whenever your medications are discontinued, doses are      changed, or new medications (including over-the-counter products) are added. *  Please carry medication information at all times in case of emergency situations. These are general instructions for a healthy lifestyle:    No smoking/ No tobacco products/ Avoid exposure to second hand smoke  Surgeon General's Warning:  Quitting smoking now greatly reduces serious risk to your health.     Obesity, smoking, and sedentary lifestyle greatly increases your risk for illness    A healthy diet, regular physical exercise & weight monitoring are important for maintaining a healthy lifestyle    You may be retaining fluid if you have a history of heart failure or if you experience any of the following symptoms:  Weight gain of 3 pounds or more overnight or 5 pounds in a week, increased swelling in our hands or feet or shortness of breath while lying flat in bed. Please call your doctor as soon as you notice any of these symptoms; do not wait until your next office visit. The discharge information has been reviewed with the patient and caregiver. The patient and caregiver verbalized understanding. Discharge medications reviewed with the patient and caregiver and appropriate educational materials and side effects teaching were provided. ___________________________________________________________________________________________________________________________________WBAT. Change dressing in 3 days. Do not get incision wet x 5 days. No lifting over 20 pounds. Take stool softeners daily while taking pain medications, since pain medicines are constipating.     Patient armband removed and shredded

## 2021-07-28 NOTE — PERIOP NOTES
Reviewed PTA medication list with patient/caregiver and patient/caregiver denies any additional medications. Patient admits to having a responsible adult care for them at home for at least 24 hours after surgery. Patient encouraged to use gown warming system and informed that using said warming gown to regulate body temperature prior to a procedure has been shown to help reduce the risks of blood clots and infection. Patient's pharmacy of choice verified and documented in PTA medication section. Dual skin assessment & fall risk band verification completed with Deshaun Graham RN.

## 2021-07-28 NOTE — INTERVAL H&P NOTE
Update History & Physical    The Patient's History and Physical was reviewed with the patient and I examined the patient. There was no change. The surgical site was confirmed by the patient and me. Plan:  The risk, benefits, expected outcome, and alternative to the recommended procedure have been discussed with the patient. Patient understands and wants to proceed with the procedure.     Electronically signed by Chuckie So MD on 7/28/2021 at 7:24 AM

## 2021-07-28 NOTE — OP NOTES
Patient: Lara Giang MRN: [id number]  SSN: [id number]    YOB: 1970  Age: 49 y.o. Sex: female      Date of Procedure: 7/28/2021   Preoperative Diagnosis: LUMBOSACRAL HNP W/RADICULOPATHY,LUMBAGO,LUMBAR STENOSIS  Postoperative Diagnosis: LUMBOSACRAL HNP W/RADICULOPATHY,LUMBAGO,LUMBAR STENOSIS    Procedure: Procedure(s):  RIGHT LUMBAR 5-SACRAL 1 MICRODISCECTOMY W/C-ARM  Surgeon(s) and Role:     Brea Byers MD - Primary  Assistant: Karen Yates PA-C  OR Assitance: Physician Assistant: ELEAZAR Gupta  Surg Asst-1: Christine Moon  Anesthesia: General   Estimated Blood Loss: 50cc  Specimens: * No specimens in log *   Findings: HNP   Complications: none      Indications: This is a 49y.o. year-old female who presents with significant right lower extremity pain, worsening ability to do activities of daily living. X-rays and MRI scan revealing disc herniation and foraminal stenosis, and degenerative disk disease. Having having failed conservative care now comes for operative intervention. DESCRIPTION OF PROCEDURE: After correct identification, the patient was   taken to the operating room, placed supine on the table, general   endotracheal anesthesia induced. The patient was then rotated onto the González frame and prepped with DuraPrep. 2grams of Ancef was given. Midline incision was made in the lumbar spine, taken down to the spinous processes of L5-S1. The posterior lamina of L5-S1 was exposed. Daksha retractor was then placed. The wound was then irrigated. Laminectomy was then performed of the inferior aspect of L5 as well as the superior aspect of S1. This provided excellent visualization of the dura. The nerve root was then mobilized medially and held with a nerve root retractor. The disc herniation was noted to be quite large and compressing the nerve root.  #15 blade was used to make a cruciate incision in the annulus and large pieces of disc material were removed from behind the annulus as well as from the disc space itself. Bleeding controlled with bipolar electrocautery. The wound was then irrigated. Fascia was then closed using #1 Vicryl suture. Subcutaneous tissue   approximated with 2-0 Vicryl suture. Skin approximated with 3-0 PDS suture and dermabond was appled. Sterile dressing was applied. The patient tolerated the procedure well and taken to Recovery room in good   condition. Assistant surgeon was needed throughout the procedure for managing bleeding, improving visualization and closure of the surgical wounds.

## 2021-07-28 NOTE — PERIOP NOTES
Dr Orantes Height aware of patient's bradycardia  in low 40's during sleep, when awake low 60's . No new oreds , keep patient in PACU for observation until HR in 50's.

## 2022-03-19 PROBLEM — Z90.49 S/P CHOLECYSTECTOMY: Status: ACTIVE | Noted: 2017-11-15

## 2022-03-19 PROBLEM — K80.00 ACUTE CALCULOUS CHOLECYSTITIS: Status: ACTIVE | Noted: 2017-11-14

## 2023-08-21 ENCOUNTER — HOSPITAL ENCOUNTER (INPATIENT)
Facility: HOSPITAL | Age: 53
LOS: 3 days | Discharge: HOME | End: 2023-08-25
Attending: EMERGENCY MEDICINE | Admitting: PSYCHIATRY & NEUROLOGY
Payer: COMMERCIAL

## 2023-08-21 DIAGNOSIS — F10.10 ALCOHOL ABUSE: ICD-10-CM

## 2023-08-21 DIAGNOSIS — F33.2 SEVERE EPISODE OF RECURRENT MAJOR DEPRESSIVE DISORDER, WITHOUT PSYCHOTIC FEATURES (HCC): Primary | ICD-10-CM

## 2023-08-21 LAB
ALBUMIN SERPL-MCNC: 3.6 G/DL (ref 3.5–5)
ALBUMIN/GLOB SERPL: 1 (ref 1.1–2.2)
ALP SERPL-CCNC: 123 U/L (ref 45–117)
ALT SERPL-CCNC: 48 U/L (ref 12–78)
ANION GAP SERPL CALC-SCNC: 7 MMOL/L (ref 5–15)
APAP SERPL-MCNC: <2 UG/ML (ref 10–30)
APPEARANCE UR: CLEAR
AST SERPL-CCNC: 20 U/L (ref 15–37)
BACTERIA URNS QL MICRO: NEGATIVE /HPF
BASOPHILS # BLD: 0.1 K/UL (ref 0–0.1)
BASOPHILS NFR BLD: 1 % (ref 0–1)
BILIRUB SERPL-MCNC: 0.2 MG/DL (ref 0.2–1)
BILIRUB UR QL: NEGATIVE
BUN SERPL-MCNC: 10 MG/DL (ref 6–20)
BUN/CREAT SERPL: 12 (ref 12–20)
CALCIUM SERPL-MCNC: 9.5 MG/DL (ref 8.5–10.1)
CHLORIDE SERPL-SCNC: 109 MMOL/L (ref 97–108)
CO2 SERPL-SCNC: 22 MMOL/L (ref 21–32)
COLOR UR: NORMAL
COMMENT:: NORMAL
CREAT SERPL-MCNC: 0.83 MG/DL (ref 0.55–1.02)
DIFFERENTIAL METHOD BLD: ABNORMAL
EOSINOPHIL # BLD: 0.6 K/UL (ref 0–0.4)
EOSINOPHIL NFR BLD: 5 % (ref 0–7)
EPITH CASTS URNS QL MICRO: NORMAL /LPF
ERYTHROCYTE [DISTWIDTH] IN BLOOD BY AUTOMATED COUNT: 14.2 % (ref 11.5–14.5)
ETHANOL SERPL-MCNC: 213 MG/DL (ref 0–0.08)
GLOBULIN SER CALC-MCNC: 3.6 G/DL (ref 2–4)
GLUCOSE SERPL-MCNC: 120 MG/DL (ref 65–100)
GLUCOSE UR STRIP.AUTO-MCNC: NEGATIVE MG/DL
HCT VFR BLD AUTO: 48.6 % (ref 35–47)
HGB BLD-MCNC: 15.7 G/DL (ref 11.5–16)
HGB UR QL STRIP: NEGATIVE
HYALINE CASTS URNS QL MICRO: NORMAL /LPF (ref 0–5)
IMM GRANULOCYTES # BLD AUTO: 0 K/UL (ref 0–0.04)
IMM GRANULOCYTES NFR BLD AUTO: 0 % (ref 0–0.5)
KETONES UR QL STRIP.AUTO: NEGATIVE MG/DL
LEUKOCYTE ESTERASE UR QL STRIP.AUTO: NEGATIVE
LYMPHOCYTES # BLD: 3.3 K/UL (ref 0.8–3.5)
LYMPHOCYTES NFR BLD: 28 % (ref 12–49)
MCH RBC QN AUTO: 30.7 PG (ref 26–34)
MCHC RBC AUTO-ENTMCNC: 32.3 G/DL (ref 30–36.5)
MCV RBC AUTO: 95.1 FL (ref 80–99)
MONOCYTES # BLD: 1 K/UL (ref 0–1)
MONOCYTES NFR BLD: 9 % (ref 5–13)
NEUTS SEG # BLD: 6.6 K/UL (ref 1.8–8)
NEUTS SEG NFR BLD: 57 % (ref 32–75)
NITRITE UR QL STRIP.AUTO: NEGATIVE
NRBC # BLD: 0 K/UL (ref 0–0.01)
NRBC BLD-RTO: 0 PER 100 WBC
PH UR STRIP: 5 (ref 5–8)
PLATELET # BLD AUTO: 266 K/UL (ref 150–400)
PMV BLD AUTO: 10.6 FL (ref 8.9–12.9)
POTASSIUM SERPL-SCNC: 3.6 MMOL/L (ref 3.5–5.1)
PROT SERPL-MCNC: 7.2 G/DL (ref 6.4–8.2)
PROT UR STRIP-MCNC: NEGATIVE MG/DL
RBC # BLD AUTO: 5.11 M/UL (ref 3.8–5.2)
RBC #/AREA URNS HPF: NORMAL /HPF (ref 0–5)
SALICYLATES SERPL-MCNC: 2.6 MG/DL (ref 2.8–20)
SARS-COV-2 RNA RESP QL NAA+PROBE: NOT DETECTED
SODIUM SERPL-SCNC: 138 MMOL/L (ref 136–145)
SOURCE: NORMAL
SP GR UR REFRACTOMETRY: 1.02 (ref 1–1.03)
SPECIMEN HOLD: NORMAL
UROBILINOGEN UR QL STRIP.AUTO: 0.2 EU/DL (ref 0.2–1)
WBC # BLD AUTO: 11.6 K/UL (ref 3.6–11)
WBC URNS QL MICRO: NORMAL /HPF (ref 0–4)

## 2023-08-21 PROCEDURE — 6360000002 HC RX W HCPCS: Performed by: EMERGENCY MEDICINE

## 2023-08-21 PROCEDURE — 80179 DRUG ASSAY SALICYLATE: CPT

## 2023-08-21 PROCEDURE — 99284 EMERGENCY DEPT VISIT MOD MDM: CPT

## 2023-08-21 PROCEDURE — 96375 TX/PRO/DX INJ NEW DRUG ADDON: CPT

## 2023-08-21 PROCEDURE — 80143 DRUG ASSAY ACETAMINOPHEN: CPT

## 2023-08-21 PROCEDURE — 87635 SARS-COV-2 COVID-19 AMP PRB: CPT

## 2023-08-21 PROCEDURE — 85025 COMPLETE CBC W/AUTO DIFF WBC: CPT

## 2023-08-21 PROCEDURE — 6360000002 HC RX W HCPCS: Performed by: STUDENT IN AN ORGANIZED HEALTH CARE EDUCATION/TRAINING PROGRAM

## 2023-08-21 PROCEDURE — 82077 ASSAY SPEC XCP UR&BREATH IA: CPT

## 2023-08-21 PROCEDURE — 36415 COLL VENOUS BLD VENIPUNCTURE: CPT

## 2023-08-21 PROCEDURE — 81001 URINALYSIS AUTO W/SCOPE: CPT

## 2023-08-21 PROCEDURE — 96372 THER/PROPH/DIAG INJ SC/IM: CPT

## 2023-08-21 PROCEDURE — 80307 DRUG TEST PRSMV CHEM ANLYZR: CPT

## 2023-08-21 PROCEDURE — 80053 COMPREHEN METABOLIC PANEL: CPT

## 2023-08-21 PROCEDURE — 2580000003 HC RX 258: Performed by: EMERGENCY MEDICINE

## 2023-08-21 PROCEDURE — 96374 THER/PROPH/DIAG INJ IV PUSH: CPT

## 2023-08-21 RX ORDER — 0.9 % SODIUM CHLORIDE 0.9 %
1000 INTRAVENOUS SOLUTION INTRAVENOUS ONCE
Status: COMPLETED | OUTPATIENT
Start: 2023-08-21 | End: 2023-08-22

## 2023-08-21 RX ORDER — LORAZEPAM 2 MG/ML
1 INJECTION INTRAMUSCULAR ONCE
Status: COMPLETED | OUTPATIENT
Start: 2023-08-21 | End: 2023-08-21

## 2023-08-21 RX ORDER — LORAZEPAM 0.5 MG/1
1 TABLET ORAL ONCE
Status: DISCONTINUED | OUTPATIENT
Start: 2023-08-21 | End: 2023-08-21

## 2023-08-21 RX ORDER — ONDANSETRON 2 MG/ML
4 INJECTION INTRAMUSCULAR; INTRAVENOUS ONCE
Status: COMPLETED | OUTPATIENT
Start: 2023-08-21 | End: 2023-08-21

## 2023-08-21 RX ADMIN — SODIUM CHLORIDE 1000 ML: 9 INJECTION, SOLUTION INTRAVENOUS at 23:18

## 2023-08-21 RX ADMIN — ONDANSETRON 4 MG: 2 INJECTION INTRAMUSCULAR; INTRAVENOUS at 23:18

## 2023-08-21 RX ADMIN — LORAZEPAM 1 MG: 2 INJECTION INTRAMUSCULAR; INTRAVENOUS at 19:12

## 2023-08-21 RX ADMIN — LORAZEPAM 1 MG: 2 INJECTION INTRAMUSCULAR; INTRAVENOUS at 23:42

## 2023-08-21 ASSESSMENT — ENCOUNTER SYMPTOMS
EYE PAIN: 0
SORE THROAT: 0
COUGH: 0
NAUSEA: 0
DIARRHEA: 0
VOMITING: 0
ABDOMINAL PAIN: 0
SHORTNESS OF BREATH: 0

## 2023-08-21 ASSESSMENT — PAIN - FUNCTIONAL ASSESSMENT: PAIN_FUNCTIONAL_ASSESSMENT: NONE - DENIES PAIN

## 2023-08-21 NOTE — BSMART NOTE
Attempted to complete BSMART assessment, but was unsuccessful d/t pt being highly intoxicated. Pt reports drinking at least a 1/5th of liquor today and states she has been drinking this much daily for the last 8 months. Pt presents as highly distraught and tearfully stating repeatedly \"Can you just call my dad at [facility]. .. I'm not crazy. .. I just want my daddy. Can you call [facility]\". Pt's ex-, Sana Dang is present at bedside and explains pt's father passed away in February 2023. Sana aDng reports pt made passive SI statements this past Saturday night w/o any plans saying that she did not want to be here anymore. Will attempt BSMART assessment again when pt is alert and able to meaningfully participate in her assessment. Notified Holly, Nursing Manager. ED Medical Provider, Dr. Yaquelin Harris is currently unavailable at this time.

## 2023-08-21 NOTE — ED PROVIDER NOTES
[address] EMERGENCY DEP  EMERGENCY DEPARTMENT ENCOUNTER      Pt Name: Thiago Rothman  MRN: [id number]  [address] [facility] [place] 1970  Date of evaluation: 8/21/2023  Provider: Ramo Mace       Chief Complaint   Patient presents with    Mental Health Problem         HISTORY OF PRESENT ILLNESS   (Location/Symptom, Timing/Onset, Context/Setting, Quality, Duration, Modifying Factors, Severity)  Note limiting factors. 71-year-old female with history of ADD, GERD and bipolar disorder presents ED with mental health concerns. Patient presents with partner who reports that patient lost her father about 6 months ago. He reports since then she has had severe depression that has been acutely worsening. She reports that she \"just wants to be with my father\". She reports that she cries often and has been drinking alcohol more than usual.  She reports she drinks a gallon of vodka every 2 to 3 days. She has never been hospitalized for these concerns but does endorse that she is established with psychiatric care. Denies suicidal or homicidal ideation and denies auditory or visual hallucinations. Review of External Medical Records:     Nursing Notes were reviewed. REVIEW OF SYSTEMS    (2-9 systems for level 4, 10 or more for level 5)     Review of Systems   Constitutional:  Negative for chills and fever. HENT:  Negative for congestion, ear pain and sore throat. Eyes:  Negative for pain. Respiratory:  Negative for cough and shortness of breath. Cardiovascular:  Negative for chest pain. Gastrointestinal:  Negative for abdominal pain, diarrhea, nausea and vomiting. Genitourinary:  Negative for dysuria and flank pain. Musculoskeletal:  Negative for myalgias. Skin:  Negative for rash. Neurological:  Negative for dizziness and headaches. Hematological:  Negative for adenopathy. Psychiatric/Behavioral:  The patient is nervous/anxious.       Except as noted above the remainder of the BMI.    Physical Exam  Vitals and nursing note reviewed. Constitutional:       General: She is not in acute distress. Appearance: Normal appearance. Eyes:      Extraocular Movements: Extraocular movements intact. Pupils: Pupils are equal, round, and reactive to light. Cardiovascular:      Rate and Rhythm: Normal rate and regular rhythm. Heart sounds: Normal heart sounds. Pulmonary:      Effort: Pulmonary effort is normal.      Breath sounds: Normal breath sounds. Abdominal:      General: Bowel sounds are normal.      Palpations: Abdomen is soft. Tenderness: There is no abdominal tenderness. Skin:     General: Skin is warm and dry. Neurological:      General: No focal deficit present. Mental Status: She is alert and oriented to person, place, and time. Psychiatric:         Mood and Affect: Mood is depressed. Affect is tearful. Behavior: Behavior normal.         Thought Content:  Thought content normal.       DIAGNOSTIC RESULTS     EKG: All EKG's are interpreted by the Emergency Department Physician who either signs or Co-signs this chart in the absence of a cardiologist.        RADIOLOGY:   Non-plain film images such as CT, Ultrasound and MRI are read by the radiologist. Plain radiographic images are visualized and preliminarily interpreted by the emergency physician with the below findings:        Interpretation per the Radiologist below, if available at the time of this note:    No orders to display        LABS:  Labs Reviewed   CBC WITH AUTO DIFFERENTIAL - Abnormal; Notable for the following components:       Result Value    WBC 11.6 (*)     Hematocrit 48.6 (*)     Eosinophils Absolute 0.6 (*)     All other components within normal limits   COMPREHENSIVE METABOLIC PANEL - Abnormal; Notable for the following components:    Chloride 109 (*)     Glucose 120 (*)     Alk Phosphatase 123 (*)     Albumin/Globulin Ratio 1.0 (*)     All other components within normal limits

## 2023-08-21 NOTE — ED TRIAGE NOTES
Patient presents from home with complaints of \"mental breakdown\" stating \" I want my daddy, he's at [facility], I want my daddy\". Patient reports to drinking excessively today and reports she is a daily drinker and drinks about a half gallon over 2-3 days.  Patient states she is on lithium

## 2023-08-22 PROBLEM — Z86.59 HISTORY OF BIPOLAR DISORDER: Status: ACTIVE | Noted: 2023-08-22

## 2023-08-22 LAB
AMPHET UR QL SCN: POSITIVE
BARBITURATES UR QL SCN: NEGATIVE
BENZODIAZ UR QL: NEGATIVE
CANNABINOIDS UR QL SCN: NEGATIVE
COCAINE UR QL SCN: NEGATIVE
ETHANOL SERPL-MCNC: <10 MG/DL (ref 0–0.08)
Lab: ABNORMAL
METHADONE UR QL: NEGATIVE
OPIATES UR QL: NEGATIVE
PCP UR QL: NEGATIVE

## 2023-08-22 PROCEDURE — 6370000000 HC RX 637 (ALT 250 FOR IP): Performed by: NURSE PRACTITIONER

## 2023-08-22 PROCEDURE — 82077 ASSAY SPEC XCP UR&BREATH IA: CPT

## 2023-08-22 PROCEDURE — 1240000000 HC EMOTIONAL WELLNESS R&B

## 2023-08-22 PROCEDURE — 6370000000 HC RX 637 (ALT 250 FOR IP)

## 2023-08-22 PROCEDURE — 6370000000 HC RX 637 (ALT 250 FOR IP): Performed by: PSYCHIATRY & NEUROLOGY

## 2023-08-22 PROCEDURE — 36415 COLL VENOUS BLD VENIPUNCTURE: CPT

## 2023-08-22 RX ORDER — PHENOBARBITAL 32.4 MG/1
32.4 TABLET ORAL 4 TIMES DAILY
Status: COMPLETED | OUTPATIENT
Start: 2023-08-23 | End: 2023-08-23

## 2023-08-22 RX ORDER — CLONIDINE HYDROCHLORIDE 0.1 MG/1
0.1 TABLET ORAL 3 TIMES DAILY PRN
Status: DISCONTINUED | OUTPATIENT
Start: 2023-08-22 | End: 2023-08-25 | Stop reason: HOSPADM

## 2023-08-22 RX ORDER — PHENOBARBITAL 32.4 MG/1
16.2 TABLET ORAL 2 TIMES DAILY
Status: DISCONTINUED | OUTPATIENT
Start: 2023-08-25 | End: 2023-08-25

## 2023-08-22 RX ORDER — LOPERAMIDE HYDROCHLORIDE 2 MG/1
2 CAPSULE ORAL 4 TIMES DAILY PRN
Status: DISCONTINUED | OUTPATIENT
Start: 2023-08-22 | End: 2023-08-25 | Stop reason: HOSPADM

## 2023-08-22 RX ORDER — PHENOBARBITAL 64.8 MG/1
64.8 TABLET ORAL 4 TIMES DAILY
Status: COMPLETED | OUTPATIENT
Start: 2023-08-22 | End: 2023-08-22

## 2023-08-22 RX ORDER — MAGNESIUM HYDROXIDE/ALUMINUM HYDROXICE/SIMETHICONE 120; 1200; 1200 MG/30ML; MG/30ML; MG/30ML
30 SUSPENSION ORAL EVERY 6 HOURS PRN
Status: DISCONTINUED | OUTPATIENT
Start: 2023-08-22 | End: 2023-08-25 | Stop reason: HOSPADM

## 2023-08-22 RX ORDER — PHENOBARBITAL 32.4 MG/1
32.4 TABLET ORAL 2 TIMES DAILY
Status: COMPLETED | OUTPATIENT
Start: 2023-08-24 | End: 2023-08-24

## 2023-08-22 RX ORDER — SENNOSIDES A AND B 8.6 MG/1
1 TABLET, FILM COATED ORAL DAILY PRN
Status: DISCONTINUED | OUTPATIENT
Start: 2023-08-22 | End: 2023-08-25 | Stop reason: HOSPADM

## 2023-08-22 RX ORDER — ROPINIROLE 0.25 MG/1
1 TABLET, FILM COATED ORAL NIGHTLY PRN
Status: DISCONTINUED | OUTPATIENT
Start: 2023-08-22 | End: 2023-08-25 | Stop reason: HOSPADM

## 2023-08-22 RX ORDER — PHENOBARBITAL 32.4 MG/1
32.4 TABLET ORAL EVERY 6 HOURS PRN
Status: DISCONTINUED | OUTPATIENT
Start: 2023-08-23 | End: 2023-08-25

## 2023-08-22 RX ORDER — ACETAMINOPHEN 325 MG/1
650 TABLET ORAL EVERY 4 HOURS PRN
Status: DISCONTINUED | OUTPATIENT
Start: 2023-08-22 | End: 2023-08-25 | Stop reason: HOSPADM

## 2023-08-22 RX ORDER — FOLIC ACID 1 MG/1
1 TABLET ORAL DAILY
Status: DISCONTINUED | OUTPATIENT
Start: 2023-08-22 | End: 2023-08-25 | Stop reason: HOSPADM

## 2023-08-22 RX ORDER — ESOMEPRAZOLE MAGNESIUM 40 MG/1
40 FOR SUSPENSION ORAL DAILY
COMMUNITY

## 2023-08-22 RX ORDER — HALOPERIDOL 5 MG/1
5 TABLET ORAL EVERY 4 HOURS PRN
Status: DISCONTINUED | OUTPATIENT
Start: 2023-08-22 | End: 2023-08-25 | Stop reason: HOSPADM

## 2023-08-22 RX ORDER — MULTIVITAMIN WITH IRON
1 TABLET ORAL DAILY
Status: DISCONTINUED | OUTPATIENT
Start: 2023-08-22 | End: 2023-08-25 | Stop reason: HOSPADM

## 2023-08-22 RX ORDER — NICOTINE 21 MG/24HR
1 PATCH, TRANSDERMAL 24 HOURS TRANSDERMAL DAILY
Status: DISCONTINUED | OUTPATIENT
Start: 2023-08-22 | End: 2023-08-25 | Stop reason: HOSPADM

## 2023-08-22 RX ORDER — POLYETHYLENE GLYCOL 3350 17 G/17G
17 POWDER, FOR SOLUTION ORAL DAILY PRN
Status: DISCONTINUED | OUTPATIENT
Start: 2023-08-22 | End: 2023-08-25 | Stop reason: HOSPADM

## 2023-08-22 RX ORDER — HALOPERIDOL 5 MG/ML
5 INJECTION INTRAMUSCULAR EVERY 4 HOURS PRN
Status: DISCONTINUED | OUTPATIENT
Start: 2023-08-22 | End: 2023-08-25 | Stop reason: HOSPADM

## 2023-08-22 RX ORDER — CETIRIZINE HYDROCHLORIDE 10 MG/1
10 TABLET ORAL DAILY
Status: DISCONTINUED | OUTPATIENT
Start: 2023-08-23 | End: 2023-08-25 | Stop reason: HOSPADM

## 2023-08-22 RX ORDER — TRAZODONE HYDROCHLORIDE 50 MG/1
50 TABLET ORAL NIGHTLY PRN
Status: DISCONTINUED | OUTPATIENT
Start: 2023-08-22 | End: 2023-08-25 | Stop reason: HOSPADM

## 2023-08-22 RX ORDER — HYDROXYZINE 50 MG/1
50 TABLET, FILM COATED ORAL 3 TIMES DAILY PRN
Status: DISCONTINUED | OUTPATIENT
Start: 2023-08-22 | End: 2023-08-25 | Stop reason: HOSPADM

## 2023-08-22 RX ORDER — LANOLIN ALCOHOL/MO/W.PET/CERES
100 CREAM (GRAM) TOPICAL DAILY
Status: DISCONTINUED | OUTPATIENT
Start: 2023-08-22 | End: 2023-08-25 | Stop reason: HOSPADM

## 2023-08-22 RX ORDER — LITHIUM CARBONATE 300 MG
900 TABLET ORAL NIGHTLY
Status: DISCONTINUED | OUTPATIENT
Start: 2023-08-22 | End: 2023-08-23

## 2023-08-22 RX ORDER — PHENOBARBITAL 32.4 MG/1
16.2 TABLET ORAL EVERY 6 HOURS PRN
Status: DISCONTINUED | OUTPATIENT
Start: 2023-08-25 | End: 2023-08-25

## 2023-08-22 RX ORDER — PHENOBARBITAL 64.8 MG/1
64.8 TABLET ORAL EVERY 6 HOURS PRN
Status: DISPENSED | OUTPATIENT
Start: 2023-08-22 | End: 2023-08-23

## 2023-08-22 RX ORDER — DIPHENHYDRAMINE HYDROCHLORIDE 50 MG/ML
50 INJECTION INTRAMUSCULAR; INTRAVENOUS EVERY 4 HOURS PRN
Status: DISCONTINUED | OUTPATIENT
Start: 2023-08-22 | End: 2023-08-25 | Stop reason: HOSPADM

## 2023-08-22 RX ORDER — ONDANSETRON 4 MG/1
4 TABLET, ORALLY DISINTEGRATING ORAL EVERY 8 HOURS PRN
Status: DISCONTINUED | OUTPATIENT
Start: 2023-08-22 | End: 2023-08-25 | Stop reason: HOSPADM

## 2023-08-22 RX ORDER — DICYCLOMINE HYDROCHLORIDE 10 MG/1
10 CAPSULE ORAL 4 TIMES DAILY PRN
Status: DISCONTINUED | OUTPATIENT
Start: 2023-08-22 | End: 2023-08-25 | Stop reason: HOSPADM

## 2023-08-22 RX ORDER — PANTOPRAZOLE SODIUM 40 MG/1
40 TABLET, DELAYED RELEASE ORAL
Status: DISCONTINUED | OUTPATIENT
Start: 2023-08-23 | End: 2023-08-25 | Stop reason: HOSPADM

## 2023-08-22 RX ADMIN — PHENOBARBITAL 64.8 MG: 64.8 TABLET ORAL at 21:26

## 2023-08-22 RX ADMIN — PHENOBARBITAL 64.8 MG: 64.8 TABLET ORAL at 08:03

## 2023-08-22 RX ADMIN — ONDANSETRON 4 MG: 4 TABLET, ORALLY DISINTEGRATING ORAL at 21:27

## 2023-08-22 RX ADMIN — DICYCLOMINE HYDROCHLORIDE 10 MG: 10 CAPSULE ORAL at 21:27

## 2023-08-22 RX ADMIN — ROPINIROLE HYDROCHLORIDE 1 MG: 0.25 TABLET, FILM COATED ORAL at 21:30

## 2023-08-22 RX ADMIN — PHENOBARBITAL 64.8 MG: 64.8 TABLET ORAL at 17:23

## 2023-08-22 RX ADMIN — LITHIUM CARBONATE 900 MG: 300 TABLET ORAL at 21:27

## 2023-08-22 RX ADMIN — Medication 100 MG: at 08:03

## 2023-08-22 RX ADMIN — Medication 1 MG: at 08:03

## 2023-08-22 RX ADMIN — PHENOBARBITAL 64.8 MG: 64.8 TABLET ORAL at 04:13

## 2023-08-22 RX ADMIN — THERA TABS 1 TABLET: TAB at 08:03

## 2023-08-22 RX ADMIN — PHENOBARBITAL 64.8 MG: 64.8 TABLET ORAL at 12:07

## 2023-08-22 ASSESSMENT — LIFESTYLE VARIABLES
HOW OFTEN DO YOU HAVE A DRINK CONTAINING ALCOHOL: 4 OR MORE TIMES A WEEK
HOW MANY STANDARD DRINKS CONTAINING ALCOHOL DO YOU HAVE ON A TYPICAL DAY: 5 OR 6

## 2023-08-22 ASSESSMENT — SLEEP AND FATIGUE QUESTIONNAIRES
DO YOU HAVE DIFFICULTY SLEEPING: YES
AVERAGE NUMBER OF SLEEP HOURS: 4
DO YOU USE A SLEEP AID: YES

## 2023-08-22 ASSESSMENT — PAIN - FUNCTIONAL ASSESSMENT: PAIN_FUNCTIONAL_ASSESSMENT: NONE - DENIES PAIN

## 2023-08-22 NOTE — BH NOTE
PSYCHOSOCIAL ASSESSMENT  :Patient identifying info:   Reggie Madrid is a 47 y.o., female admitted 8/21/2023  5:55 PM     Presenting problem and precipitating factors: Pt reported to ED with  due to increased depressive symptoms, SI and increased ETOH intake. Pt has historical dx of Bipolar. Pt reports death of father in February which is when ETOH increase began. Pt reports sleep and appetite disturbance. PT denies HI and hx of AVH but reports hearing her fathers voice over the past few weeks. Mental status assessment: AOX4, depressed affect, tearful throughout assessment, fair historian, appropriately responds to assessment questions    Strengths/Recreation/Coping Skills: Voluntary, insured, family support    Collateral information: Jory Warner, spouse [phone]    Current psychiatric /substance abuse providers and contact info[facility]     Previous psychiatric/substance abuse providers and response to treatment: Pt denies previous psych admissions. Family history of mental illness or substance abuse: Hx of alcoholism in family, grandmother undiagnosed schizophrenia, uncles depression, fathers uncle completed suicide    Substance abuse history: Daily ETOH use, 1/5th or more daily of vodka, daily nicotine use  Social History     Tobacco Use    Smoking status: Every Day     Packs/day: 0.50     Types: Cigarettes    Smokeless tobacco: Never   Substance Use Topics    Alcohol use: Yes       History of biomedical complications associated with substance abuse: diarrhea, nausea, cravings    Patient's current acceptance of treatment or motivation for change: Voluntary    Family constellation: , no children    Is significant other involved?  No    Describe support system: Some community support, some family support    Describe living arrangements and home environment: Pt lives with ex-boyfriend but was kicked out of home, locks were changed, and has been staying with friends in [place] [facility]

## 2023-08-22 NOTE — BSMART NOTE
BSMART assessment completed, and suicide risk level noted to be low risk. Primary Nurse Marilia Cueto and [id number]illo notified. Concerns not observed. Security/Off- has not been notified.

## 2023-08-22 NOTE — INTERDISCIPLINARY ROUNDS
Behavioral Health Interdisciplinary Rounds     Patient Name: Glenis Shukla  Age: 47 y.o. Room/Bed:  6/  Primary Diagnosis: History of bipolar disorder   Admission Status: Voluntary    Readmission within 30 days: No  Power of  in place: No  Patient requires a blocked bed: No          Reason for blocked bed:   Sleep hours: 4.5       Participation in Care/Groups:  N/A  Medication Compliant?:     PRNS (last 24 hours):  Phenobarbitol    Restraints (last 24 hours):  No  __________________________________________________  OQ Admission Analysis Survey completed:  OQ Admission Analysis Survey score:  __________________________________________________     Alcohol screening (AUDIT) completed -     If applicable, date SBIRT discussed in treatment team AND documented:    Tobacco - patient is a smoker:    Illegal Drugs use:      24 hour chart check complete:   _______________________________________________    Patient goal(s) for today:   Treatment team focus/goals:   Progress note: Patient met with treatment team for the first time since admission and appeared with a depressed mood and affect. Patient reports increased alcohol use, drinking 1/5th+ daily of Vodka over the course of 3 years. Patient tried stopping cold turkey 3 weeks ago but experienced withdrawal symptoms over the course of 7 days (headaches, bad dreams, diarrhea, poor appetite). Patient denies SI/HI and VH but reports worsening depression, reporting AH of someone knocking on doors and someone trying to break in through the door. Denies hx of hospitalizations and hx of treatment programs. Patient was encouraged to seek treatment by ex- who struggled with alcohol use and is now in recovery. Patient is reporting withdrawal symptoms, NP placed medications for comfort measures. Patient is interested in 30 day program at this time, SW to refer to [facility].     1320: SW contacted [facility] representative regarding insurance and

## 2023-08-22 NOTE — BH NOTE
GROUP THERAPY PROGRESS NOTE    Patient did not participate in recreational therapy group.     MARTHA Roberts, QMHP-A

## 2023-08-22 NOTE — BSMART NOTE
Comprehensive Assessment Form Part 1      Section I - Disposition    Bipolar, per history  Alcohol dependence    Past Medical History:   Diagnosis Date    ADD (attention deficit disorder)     Bladder incontinence     GERD (gastroesophageal reflux disease)     Psychiatric disorder     bipolar       The Medical Doctor to Psychiatrist conference was not completed. The Medical Doctor is in agreement with Psychiatrist disposition because patient is agreeable to inpatient [address] treatment. The plan is to admit to the [address] The on-call Psychiatrist consulted was N/A. The admitting Psychiatrist will be TBVELVET. The admitting Diagnosis is Bipolar disorder, alcohol dependence. The Payor source is [facility]. This writer reviewed the [address] in nursing flowsheet and the risk level assigned is no risk. Based on this assessment, the risk of suicide is low risk and the plan is to admit to the [address] Section II - Integrated Summary  Summary:  Patient arrived to the ED accompanied by her , Rani Aranda, with complaints of SI and increased alcohol use. This clinician met with Jessy Guadarrama face to face in the ED. She was appropriately dressed but unkempt. She was oriented x4 and was calm and cooperative during the assessment. Jessy Guadarrama presented as depressed and sad and expressed grief over losing her father in February. She endorses passive SI without a plan. She denies a history of suicide attempts or inpatient U admissions. No HI noted. Jessy Guadarrama has a historical diagnosis of Bipolar disorder, which is being managed outpatient by her PCP, Dr. Marion Finn. She reports medication compliance. She also has a counselor, Pearl Millan, whom she sees weekly and has an appointment this Thursday. Jessy Guadarrama reports an increase in alcohol consumption and is drinking about 1/5 of liquor daily. She reports a history of alcohol abuse prior to February but was not drinking nearly the same amount.  No other substance use described as not assessed. The patient is currently employed and speaks [place] as a primary language. The patient has no communication impairments affecting communication. The patient's preference for learning can be described as: can read and write adequately. The patient's hearing is uses a hearing aid.   The patient's vision is normal.      Julito Damon LCSW

## 2023-08-22 NOTE — DISCHARGE INSTRUCTIONS
DISCHARGE SUMMARY    Umair Santos  : 1970  MRN: [id number]    The patient Kash Ibrahim exhibits the ability to control behavior in a less restrictive environment. Patient's level of functioning is improving. No assaultive/destructive behavior has been observed for the past 24 hours. No suicidal/homicidal threat or behavior has been observed for the past 24 hours. There is no evidence of serious medication side effects. Patient has not been in physical or protective restraints for at least the past 24 hours. If weapons involved, how are they secured? None    Is patient aware of and in agreement with discharge plan? Yes    Arrangements for medication:  Prescriptions filled through [address] [place] [address], 30 day supply provided    Copy of discharge instructions to provider?:  Yes faxed ro [address] [facility] [address]    Arrangements for transportation home: Family    Keep all follow up appointments as scheduled, continue to take prescribed medications per physician instructions. Mental health crisis number:  921 or your local mental health crisis line number at [facility] at [facility] Emergency WARM LINE      [phone] [phone])      M-F: 9am to 9pm      Sat & Sun: [facility] suicide prevention lines:                             0-984-OXVBRER ([phone])       [phone]-TALK ([phone])    Crisis Text Line:  Text HOME to [address] from Nurse    PATIENT INSTRUCTIONS:    What to do at Home:    If you experience any of the following symptoms thoughts of self-harm or others please go to the nearest Emergency Department. Please follow up with your primary care provider. *  Please give a list of your current medications to your Primary Care Provider.     *  Please update this list whenever your medications are discontinued, doses are      changed, or new medications (including over-the-counter products) are

## 2023-08-22 NOTE — BH NOTE
GROUP THERAPY PROGRESS NOTE    Patient is participating in Safety Planning group. Group time: 15-30 minutes    Personal goal for participation: To complete safety plan     Goal orientation: Personal    Group therapy participation: Passive      Therapeutic interventions reviewed and discussed:  Group members were supported in filling out safety plans. Pts are able to develop and document a strategy to remain safe after discharge. SW provided feedback about questions/concerns of pts. Pts returned safety plans when completed. Impression of participation:  SW provided safety plan to pt to be completed independently.     MARTHA Zhang, HP-A

## 2023-08-22 NOTE — PROGRESS NOTES
Spiritual Care Assessment/Progress Note  [place]    Name: Chandrika Kim MRN: [id number]    Age: 47 y.o. Sex: female   Language: English     Date: 8/22/2023            Total Time Calculated: 10 min              Spiritual Assessment begun in [facility]  Service Provided For[de-identified] Patient  Referral/Consult From[de-identified] Rounding  Encounter Overview/Reason : Attempted Encounter, Behavioral Health    Spiritual beliefs:      [] Involved in a gina tradition/spiritual practice:      [] Supported by a gina community:      [] Claims no spiritual orientation:      [] Seeking spiritual identity:           [] Adheres to an individual form of spirituality:      [x] Not able to assess:                Identified resources for coping and support system:           [] Prayer                  [] Devotional reading               [] Music                  [] Guided Imagery     [] Pet visits                                        [] Other: (COMMENT)     Specific area/focus of visit   Encounter:    Crisis:    Spiritual/Emotional needs:    Ritual, Rites and Sacraments:    Grief, Loss, and Adjustments:    Ethics/Mediation:    Behavioral Health:    Palliative Care: Advance Care Planning:           Narrative: Attempted initial assessment with Chandrika Kim. She was asleep when I rounded. I will revisit later in the shift or during this admission. .      For additional spiritual care, please contact the  on-call at ([phone]). .  Anna Rene MDiv, MS, [facility]  Staff

## 2023-08-22 NOTE — BH NOTE
Admission Note      Admitting Diagnosis: Bipolar by hx, ETOH dependence      Admitting Status: Voluntary      Patient Received From: [address] ED      Patient Condition Upon Arrival: Cooperative anxious      BAL & UDS: 213 at 1813 8/21, amphetamine     Consent for treatment signed, patient contracts for safety. Currently denies SI/HI/AVH. Reason For Admission:  SI no plan and increased ETOH use (1/5 of liquor daily). Patient experiencing grief since father passed in February.

## 2023-08-22 NOTE — BH NOTE
4 Eyes Skin Assessment     NAME:  Varghese Dumont  YOB: 1970  MEDICAL RECORD NUMBER:  [id number]    The patient is being assessed for  Admission    I agree that at least one RN has performed a thorough Head to Toe Skin Assessment on the patient. ALL assessment sites listed below have been assessed. Areas assessed by both nurses:    Head, Face, Ears, Shoulders, Back, Chest, Arms, Elbows, Hands, Sacrum. Buttock, Coccyx, Ischium, and Legs. Feet and Heels        Does the Patient have a Wound?  No noted wound(s)       Shaka Prevention initiated by RN:   N/A  Wound Care Orders initiated by RN:   N/A    Pressure Injury (Stage 3,4, Unstageable, DTI, NWPT, and Complex wounds) if present, place Wound referral order by RN under : [address] [facility],[address], if present place, Ostomy referral order under :   N/A     Nurse 1 eSignature: Electronically signed by Jessica Pierce RN on 8/22/23 at 2:53 AM EDT    **SHARE this note so that the co-signing nurse can place an eSignature**    Nurse 2 eSignature: {Esignature:[id number]}

## 2023-08-22 NOTE — BSMART NOTE
Patient was accepted to [facility] bed 746-2 by SANGEETHA Diana on behalf of Dr. Logan Solis. The number for report is ext [phone]. Patient's nurse notified.

## 2023-08-22 NOTE — PLAN OF CARE
Problem: Depression  Goal: Will be euthymic at discharge  Description: INTERVENTIONS:  1. Administer medication as ordered  2. Provide emotional support via 1:1 interaction with staff  3. Encourage involvement in milieu/groups/activities  4. Monitor for social isolation  Outcome: Progressing  Note: Out on unit this am appears sad to worried. Reports feeling safe denies SI, no plan, no intent and no urge to self harm. Describes increasing amount depressed mood with hopelessness and now guilt related to her ETOH use. Daily using 1/5 vodka or more daily and in morning. Medication compliant with her mood stabilizer. Admission goal is to safety detox and address her increased depressed mood. Staff focus is on offering support    1700: out on unit using phone, noted tearful at that time. Nursing offer support pt request to be alone. Will continue to monitor and offer support     Problem: Drug Abuse/Detox  Goal: Will have no detox symptoms and will verbalize plan for changing drug-related behavior  Description: INTERVENTIONS:  1. Administer medication as ordered  2. Monitor physical status  3. Provide emotional support with 1:1 interaction with staff  4.  Encourage  recovery focused treatment   Outcome: Progressing  Flowsheets (Taken 8/22/2023 1122)  Will have no detox symptoms and will verbalize plan for changing drug-related behavior:   Administer medication as ordered   Monitor physical status   Encourage recovery focused treatment  Note: CIWA unremarkable, prescribed and accepting phenobarbital as ordered        [facility]  Master Treatment Plan for Felipe Pate    Date Treatment Plan Initiated: 8/22/23    Treatment Plan Modalities:  Type of Modality Amount  (x minutes) Frequency (x/week) Duration (x days) Name of Responsible Staff   [address] [facility],8W meetings to encourage peer interactions 15 7 [facility] psychotherapy to assist in building coping skills and internal controls 61 7 1 Eder Chappell MSW   Therapeutic activity groups to build coping skills 60 7 1 Eder Chappell MSW   Psychoeducation in group setting to address:   Medication education   [facility] PharmD   Coping skills   [address] [facility],  Jazmin Chávez   Relaxation techniques         Symptom management         Discharge planning   60 2 [address], Deya   Spirituality    60 2 1 Chaplain JOHNSON   61 1 1 volunteer   Recovery/AA/NA      volunteer   Physician medication management   15 7 [address] NP   Family meeting/discharge planning   15 2 [address] [facility]

## 2023-08-22 NOTE — BH NOTE
GROUP THERAPY PROGRESS NOTE    Patient did not participate in Healthy Living and Wellness group.     MARTHA Elena, [facility]-A

## 2023-08-22 NOTE — H&P
[address] [facility]  INITIAL PSYCHIATRIC INTERVIEW:    Name: Jase Hastings  MR#: [id number]  ACCOUNT#: [de-identified]  : 1970  ADMIT DATE: 2023    CHIEF COMPLAINT: \"My drinking is out of control. \"     HISTORY OF PRESENTING COMPLAINT:  This is a 47 y.o. female who is currently admitted to the general psychiatric floor at [facility] on a voluntary basis for worsening depression and etoh abuse secondary to grieving her father's death. She has been drinking about a fifth or more of vodka daily for the past 3+ years. She has started drinking in the mornings as well. She attempted to stop drinking cold turkey about 3 weeks ago, but due to severity of withdrawal symptoms, she couldn't function. She lasted 7 days without drinking. She endorses severe depression recently as well. She has been taking her lithium consistently. She denies current SI/plan/intent, denies HI/plan/intent, denies current AH/VH, but does endorse auditory hallucinations while intoxicated. She finally reached a point where she felt like her drinking was out of control after she wasn't able to stop on her own, which is what prompted her to seek care. PAST PSYCHIATRIC HISTORY: The pt has a hx of bipolar disorder and has been on lithium for 30+ years. She takes 600mg lithium QHS, though she has been prescribed 600mg BID. She reports taking a lower than prescribed dose because she is afraid of running out. She has never taken a higher dose than that. The pt has been taking Ambien, lithium, and Adderall. Her PCP, Dr. Anne Thakur prescribes the lithium. No hx of suicide attempts or psychiatric hospitalizations. She has never done rehab or any substance use treatment. She sees a therapist, Italo Montoya with [facility], but does not have a psychiatrist.     PAST MEDICATION TRIALS: Wellbutrin, trazodone, Ambien, others that the pt doesn't recall    SUBSTANCE ABUSE HISTORY:  Etoh: Drinks >1/5 vodka daily x 3 years.  Last She was a heavy drinker prior to this as well. THC: denies  Cocaine: denies  Stimulants:Prescribed Adderall by PCP, has been on it for 20 years. Opioids: Denies  IVDU: Denies  Hallucinogens: Denies  Tobacco/nicotine: 1 PPD    PSYCHOSOCIAL HISTORY: Pt is  but . She was recently kicked out of her ex-boyfriend's house about 2-3 weeks ago. He changed the locks on her. She has been staying with friends in [place] or in a motel since being evicted. Her  (she calls her ex-, but they are still legally  though ), is still part of her support system and has offered to let her live with him. She has no children. 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 Denies a legal hx. No access to guns. Highest level of education completed is some college. FAMILY HISTORY:   Grandmother - schizophrenia  Family hx of etoh use disorder in uncles, aunts, cousins  Paternal great uncle -  by suicide     PAST MEDICAL HISTORY:   Past Medical History:   Diagnosis Date    ADD (attention deficit disorder)     Bladder incontinence     GERD (gastroesophageal reflux disease)     Psychiatric disorder     bipolar     ALLERGIES: Wellbutrin     MENTAL STATUS EXAM:   General:  Juan Simmons is a 47 y.o. White (non-) female who is in poor grooming, dressed in hospital gown. Makes poor eye contact. Psychomotor activity is restless  Speech is normal in volume, tone, output and prosody   Mood is described as \"terrible\"   Affect is dysthymic and tearful  No perceptual abnormalities elicited; no auditory/visual hallucinations reported, no overt signs of psychosis or paranoia.    Thought process: logical and goal directed, linear   Thought content: denies SI/plan/intent, denies HI/plan/intent  Sensorium: Alert, awake and oriented X 4  Attention/Concentration: fair  Insight: fair  Judgment: poor     DIAGNOSTIC IMPRESSION: Bipolar disorder, current episode depressed; alcohol use disorder,

## 2023-08-22 NOTE — BH NOTE
GROUP THERAPY PROGRESS NOTE  No group due to low patient participation. SW provided handouts for pts to work on independently.   MARTHA Ogden, QMHP-A

## 2023-08-22 NOTE — ED NOTES
TRANSFER - OUT REPORT:    Verbal report given to Michelle Brooks RN on Claribel Rodríguez  being transferred to [facility] for routine progression of patient care       Report consisted of patient's Situation, Background, Assessment and   Recommendations(SBAR). Information from the following report(s) ED SBAR was reviewed with the receiving nurse. Hillrose Fall Assessment:                           Lines:   Peripheral IV 08/21/23 Proximal;Right; Anterior Forearm (Active)        Opportunity for questions and clarification was provided.       Patient transported with:  Hussain Hickman RN  08/22/23 3271

## 2023-08-23 LAB
EKG ATRIAL RATE: 78 BPM
EKG DIAGNOSIS: NORMAL
EKG P AXIS: 30 DEGREES
EKG P-R INTERVAL: 134 MS
EKG Q-T INTERVAL: 366 MS
EKG QRS DURATION: 72 MS
EKG QTC CALCULATION (BAZETT): 417 MS
EKG R AXIS: 32 DEGREES
EKG T AXIS: 57 DEGREES
EKG VENTRICULAR RATE: 78 BPM

## 2023-08-23 PROCEDURE — 6370000000 HC RX 637 (ALT 250 FOR IP)

## 2023-08-23 PROCEDURE — 6370000000 HC RX 637 (ALT 250 FOR IP): Performed by: PSYCHIATRY & NEUROLOGY

## 2023-08-23 PROCEDURE — 6370000000 HC RX 637 (ALT 250 FOR IP): Performed by: NURSE PRACTITIONER

## 2023-08-23 PROCEDURE — 1240000000 HC EMOTIONAL WELLNESS R&B

## 2023-08-23 RX ORDER — LITHIUM CARBONATE 300 MG
600 TABLET ORAL NIGHTLY
Status: DISCONTINUED | OUTPATIENT
Start: 2023-08-23 | End: 2023-08-25 | Stop reason: HOSPADM

## 2023-08-23 RX ADMIN — Medication 100 MG: at 07:57

## 2023-08-23 RX ADMIN — PHENOBARBITAL 32.4 MG: 32.4 TABLET ORAL at 13:04

## 2023-08-23 RX ADMIN — DICYCLOMINE HYDROCHLORIDE 10 MG: 10 CAPSULE ORAL at 13:05

## 2023-08-23 RX ADMIN — PHENOBARBITAL 64.8 MG: 64.8 TABLET ORAL at 01:30

## 2023-08-23 RX ADMIN — CETIRIZINE HYDROCHLORIDE 10 MG: 10 TABLET, FILM COATED ORAL at 07:57

## 2023-08-23 RX ADMIN — ONDANSETRON 4 MG: 4 TABLET, ORALLY DISINTEGRATING ORAL at 13:07

## 2023-08-23 RX ADMIN — PHENOBARBITAL 32.4 MG: 32.4 TABLET ORAL at 20:58

## 2023-08-23 RX ADMIN — Medication 1 MG: at 07:57

## 2023-08-23 RX ADMIN — ONDANSETRON 4 MG: 4 TABLET, ORALLY DISINTEGRATING ORAL at 20:59

## 2023-08-23 RX ADMIN — LITHIUM CARBONATE 600 MG: 300 TABLET ORAL at 20:58

## 2023-08-23 RX ADMIN — PANTOPRAZOLE SODIUM 40 MG: 40 TABLET, DELAYED RELEASE ORAL at 07:57

## 2023-08-23 RX ADMIN — ROPINIROLE HYDROCHLORIDE 1 MG: 0.25 TABLET, FILM COATED ORAL at 20:58

## 2023-08-23 RX ADMIN — PHENOBARBITAL 32.4 MG: 32.4 TABLET ORAL at 07:57

## 2023-08-23 RX ADMIN — PHENOBARBITAL 32.4 MG: 32.4 TABLET ORAL at 18:12

## 2023-08-23 RX ADMIN — THERA TABS 1 TABLET: TAB at 07:57

## 2023-08-23 ASSESSMENT — PAIN SCALES - GENERAL: PAINLEVEL_OUTOF10: 0

## 2023-08-23 NOTE — PLAN OF CARE
Problem: Depression  Goal: Will be euthymic at discharge  Description: INTERVENTIONS:  1. Administer medication as ordered  2. Provide emotional support via 1:1 interaction with staff  3. Encourage involvement in milieu/groups/activities  4. Monitor for social isolation  Outcome: Progressing  Note: Up  on unit for meals and meds. Appears sad to tearful at times. Describes GI upset related to increase in LI at night dose. CIWA elevated and medicated per order. Denies SI, no plan, no intent and no urge to self harm. Describes anxiety and hopelessness continues. Staff focus is on offering support and reassurance. Problem: Drug Abuse/Detox  Goal: Will have no detox symptoms and will verbalize plan for changing drug-related behavior  Description: INTERVENTIONS:  1. Administer medication as ordered  2. Monitor physical status  3. Provide emotional support with 1:1 interaction with staff  4. Encourage  recovery focused treatment   Outcome: Progressing  FWill have no detox symptoms and will verbalize plan for changing drug-related behavior:   Monitor physical status   Encourage recovery focused treatment   Administer medication as ordered  Note: CIWA 9/10 this am and accepted medication. Will continue to monitor and offer medication per order.  Motivation remains high to work on sober

## 2023-08-23 NOTE — PLAN OF CARE
Problem: Safety - Adult  Goal: Free from fall injury  Outcome: Progressing    Resting in bed with eyes closed, no complaints, no distress noted. Safety measures in place, will continue to monitor.

## 2023-08-23 NOTE — INTERDISCIPLINARY ROUNDS
Behavioral Health Interdisciplinary Rounds     Patient Name: Olga Sumner  Age: 47 y.o. Room/Bed:  [facility]/  Primary Diagnosis: History of bipolar disorder   Admission Status: Voluntary    Readmission within 30 days: No  Power of  in place: No  Patient requires a blocked bed: No          Reason for blocked bed:   Sleep hours:        Participation in Care/Groups:  No  Medication Compliant?: Yes  PRNS (last 24 hours): None   Restraints (last 24 hours):  No  __________________________________________________  OQ Admission Analysis Survey completed:  OQ Admission Analysis Survey score:  __________________________________________________     Alcohol screening (AUDIT) completed -     If applicable, date SBIRT discussed in treatment team AND documented:    Tobacco - patient is a smoker:    Illegal Drugs use:      24 hour chart check complete: Yes    _______________________________________________    Patient goal(s) for today:   Treatment team focus/goals:   Progress note: Patient met with treatment team and appeared with a depressed and somewhat irritable mood and affect. Patient reports nausea and discomfort from detox. Patient reports poor sleep last night due to restlessness and physical discomfort. Patient reports stomach discomfort from Lithium increase and is requesting to decrease back to 600mg. NP recommending 450mg AM/PM but patient declining at this time. SW provided contact information to [facility] to discuss program and intake. Patient to call.      Spiritual Care Consult:   Financial concerns/prescription coverage:    Family contact:                        Family requesting physician contact today:    Discharge plan:   Access to weapons :                                                              Outpatient provider(s):     LOS:  1  Expected LOS: 5-7    Participating treatment team members: Olga Sumner,  Rajesh Francois MSW, Fran Herrera, RN, Wilda Baez, SANGEETHA, Fadumo Zhu PharmD

## 2023-08-23 NOTE — BH NOTE
GROUP THERAPY PROGRESS NOTE    Patient is participating in substance use and coping skills group. Group time: 45 minutes    Personal goal for participation: to gain an understanding of parallels of Codependency and addiction; symptoms and treatments. Goal orientation: Personal    Group therapy participation: Passive     Therapeutic interventions reviewed and discussed:  Group members were guided through learning about codependency in relationships. Members gained an understanding of what codependency is and how it effects their mental health. Members completed a codependency questionnaire and identified any past or current experience with a codependent relationship. Handouts provided. Impression of participation:  Due to low participation, Sw provided pts with handouts to read independently. Sw available to discuss questions/concerns if needed.        Sloane Fiore, MARTHA, QMHP-A

## 2023-08-23 NOTE — BH NOTE
GROUP THERAPY PROGRESS NOTE    Patient did not participate in psychotherapy group.     Mary THOMAS, [facility]

## 2023-08-24 PROCEDURE — 6370000000 HC RX 637 (ALT 250 FOR IP): Performed by: NURSE PRACTITIONER

## 2023-08-24 PROCEDURE — 6370000000 HC RX 637 (ALT 250 FOR IP)

## 2023-08-24 PROCEDURE — 6370000000 HC RX 637 (ALT 250 FOR IP): Performed by: PSYCHIATRY & NEUROLOGY

## 2023-08-24 PROCEDURE — 1240000000 HC EMOTIONAL WELLNESS R&B

## 2023-08-24 RX ORDER — LURASIDONE HYDROCHLORIDE 20 MG/1
20 TABLET, FILM COATED ORAL
Status: DISCONTINUED | OUTPATIENT
Start: 2023-08-24 | End: 2023-08-25 | Stop reason: HOSPADM

## 2023-08-24 RX ADMIN — PHENOBARBITAL 32.4 MG: 32.4 TABLET ORAL at 20:26

## 2023-08-24 RX ADMIN — LITHIUM CARBONATE 600 MG: 300 TABLET ORAL at 20:19

## 2023-08-24 RX ADMIN — Medication 100 MG: at 09:27

## 2023-08-24 RX ADMIN — ACETAMINOPHEN 650 MG: 325 TABLET ORAL at 02:35

## 2023-08-24 RX ADMIN — PANTOPRAZOLE SODIUM 40 MG: 40 TABLET, DELAYED RELEASE ORAL at 06:27

## 2023-08-24 RX ADMIN — Medication 1 MG: at 09:27

## 2023-08-24 RX ADMIN — LURASIDONE HYDROCHLORIDE 20 MG: 20 TABLET, FILM COATED ORAL at 17:27

## 2023-08-24 RX ADMIN — PHENOBARBITAL 32.4 MG: 32.4 TABLET ORAL at 11:12

## 2023-08-24 RX ADMIN — THERA TABS 1 TABLET: TAB at 09:27

## 2023-08-24 RX ADMIN — CETIRIZINE HYDROCHLORIDE 10 MG: 10 TABLET, FILM COATED ORAL at 09:27

## 2023-08-24 RX ADMIN — DICYCLOMINE HYDROCHLORIDE 10 MG: 10 CAPSULE ORAL at 02:34

## 2023-08-24 ASSESSMENT — PAIN DESCRIPTION - LOCATION
LOCATION: HEAD
LOCATION: ABDOMEN

## 2023-08-24 ASSESSMENT — PAIN SCALES - GENERAL
PAINLEVEL_OUTOF10: 7
PAINLEVEL_OUTOF10: 4

## 2023-08-24 NOTE — BH NOTE
GROUP THERAPY PROGRESS NOTE    Patient did not participate in recreational therapy group.     Sloane Fiore, MARTHA, QM-A

## 2023-08-24 NOTE — PLAN OF CARE
Problem: Safety - Adult  Goal: Free from fall injury  Outcome: Progressing     Problem: Depression  Goal: Will be euthymic at discharge  Description: INTERVENTIONS:  1. Administer medication as ordered  2. Provide emotional support via 1:1 interaction with staff  3. Encourage involvement in milieu/groups/activities  4. Monitor for social isolation  8/23/2023 2045 by Vishnu Baker RN  Outcome: Progressing      Problem: Drug Abuse/Detox  Goal: Will have no detox symptoms and will verbalize plan for changing drug-related behavior  Description: INTERVENTIONS:  1. Administer medication as ordered  2. Monitor physical status  3. Provide emotional support with 1:1 interaction with staff  4.  Encourage  recovery focused treatment

## 2023-08-24 NOTE — PROGRESS NOTES
Laboratory Monitoring for Antipsychotics:     This patient is currently prescribed the following medication(s):   Current Facility-Administered Medications: lurasidone (LATUDA) tablet 20 mg, 20 mg, Oral, Dinner  lithium tablet 600 mg, 600 mg, Oral, Nightly  acetaminophen (TYLENOL) tablet 650 mg, 650 mg, Oral, Q4H PRN  polyethylene glycol (GLYCOLAX) packet 17 g, 17 g, Oral, Daily PRN  senna (SENOKOT) tablet 8.6 mg, 1 tablet, Oral, Daily PRN  aluminum & magnesium hydroxide-simethicone (MAALOX) 200-200-20 MG/5ML suspension 30 mL, 30 mL, Oral, Q6H PRN  hydrOXYzine HCl (ATARAX) tablet 50 mg, 50 mg, Oral, TID PRN  haloperidol (HALDOL) tablet 5 mg, 5 mg, Oral, Q4H PRN **OR** haloperidol lactate (HALDOL) injection 5 mg, 5 mg, IntraMUSCular, Q4H PRN  diphenhydrAMINE (BENADRYL) injection 50 mg, 50 mg, IntraMUSCular, Q4H PRN  traZODone (DESYREL) tablet 50 mg, 50 mg, Oral, Nightly PRN  [COMPLETED] PHENobarbital (LUMINAL) tablet 64.8 mg, 64.8 mg, Oral, 4x daily **FOLLOWED BY** [COMPLETED] PHENobarbital (LUMINAL) tablet 32.4 mg, 32.4 mg, Oral, 4x daily **FOLLOWED BY** PHENobarbital (LUMINAL) tablet 32.4 mg, 32.4 mg, Oral, BID **FOLLOWED BY** [START ON 2023] PHENobarbital (LUMINAL) tablet 16.2 mg, 16.2 mg, Oral, BID  [] PHENobarbital (LUMINAL) tablet 64.8 mg, 64.8 mg, Oral, Q6H PRN **FOLLOWED BY** PHENobarbital (LUMINAL) tablet 32.4 mg, 32.4 mg, Oral, Q6H PRN **FOLLOWED BY** [START ON 2023] PHENobarbital (LUMINAL) tablet 16.2 mg, 16.2 mg, Oral, Q6H PRN  thiamine tablet 100 mg, 100 mg, Oral, Daily  folic acid (FOLVITE) tablet 1 mg, 1 mg, Oral, Daily  multivitamin 1 tablet, 1 tablet, Oral, Daily  ondansetron (ZOFRAN-ODT) disintegrating tablet 4 mg, 4 mg, Oral, Q8H PRN  dicyclomine (BENTYL) capsule 10 mg, 10 mg, Oral, 4x Daily PRN  loperamide (IMODIUM) capsule 2 mg, 2 mg, Oral, 4x Daily PRN  cloNIDine (CATAPRES) tablet 0.1 mg, 0.1 mg, Oral, TID PRN  cetirizine (ZYRTEC) tablet 10 mg, 10 mg, Oral, Daily  pantoprazole monitoring based on the patient's current medication regimen.         Brad Chow, [facility]

## 2023-08-24 NOTE — INTERDISCIPLINARY ROUNDS
Behavioral Health Interdisciplinary Rounds     Patient Name: Chandrika Kim  Age: 47 y.o. Room/Bed:  6/  Primary Diagnosis: History of bipolar disorder   Admission Status: Voluntary    Readmission within 30 days: No  Power of  in place: No  Patient requires a blocked bed: No          Reason for blocked bed:   Sleep hours:  7+      Participation in Care/Groups:  No  Medication Compliant?: Yes  PRNS (last 24 hours): None   Restraints (last 24 hours):  No  __________________________________________________  OQ Admission Analysis Survey completed:  OQ Admission Analysis Survey score:  __________________________________________________     Alcohol screening (AUDIT) completed -     If applicable, date SBIRT discussed in treatment team AND documented:    Tobacco - patient is a smoker:    Illegal Drugs use:      24 hour chart check complete: Yes    _______________________________________________    Patient goal(s) for today:   Treatment team focus/goals:   Progress note: Patient met with treatment team and appeared with a brighter mood and affect. Patient denies SI/HI and [facility] at this time, reports detox symptoms are improving. Reports headache, some nausea and poor sleep. Plan to keep lithium at current level and plan to start Latuda, plan to move Zyrtec to bedtime due to daytime drowsiness. Patient to call [facility] contact today.       Spiritual Care Consult: Yes  Financial concerns/prescription coverage:  Insured  Family contact:  None  Family requesting physician contact today:  No  Discharge plan: 30 day rehab center  Access to weapons : None                                      Outpatient provider(s): N[facility]isa ORDOÑEZ    LOS:  2  Expected LOS: 5-7    Participating treatment team members: Chandrika Kim, Daisy Castellano MSW, Mandy Dee RN, Rajesh Friedman NP, Chance GarayD

## 2023-08-24 NOTE — BH NOTE
GROUP THERAPY PROGRESS NOTE    Patient did not participate in psychotherapy group.     Namita Fuentes MSW, [facility]-A

## 2023-08-24 NOTE — PLAN OF CARE
Problem: Safety - Adult  Goal: Free from fall injury  8/23/2023 2353 by Roseanne Zavaleta, RN  Outcome: Progressing          Resting in bed with eyes closed, no complaints, no distress noted. Safety measures in place, will continue to monitor.

## 2023-08-24 NOTE — PROGRESS NOTES
I prayed with Florentino Graves and celebrated with her the [id number].   Father Karl Collins

## 2023-08-24 NOTE — PLAN OF CARE
Problem: Drug Abuse/Detox  Goal: Will have no detox symptoms and will verbalize plan for changing drug-related behavior  Description: INTERVENTIONS:  1. Administer medication as ordered  2. Monitor physical status  3. Provide emotional support with 1:1 interaction with staff  4. Encourage  recovery focused treatment   Outcome: Progressing  Flowsheets (Taken 8/23/2023 1011)  Will have no detox symptoms and will verbalize plan for changing drug-related behavior:   Monitor physical status   Encourage recovery focused treatment   Administer medication as ordered  Note: Motivation remains high, CIWA unremarkable. Problem: Depression  Goal: Will be euthymic at discharge  Description: INTERVENTIONS:  1. Administer medication as ordered  2. Provide emotional support via 1:1 interaction with staff  3. Encourage involvement in milieu/groups/activities  4. Monitor for social isolation  Outcome: Progressing  Note: Denies SI, no plan, no intent, no urge to self harm.  Hopeful for future and motivated to work on sobriety

## 2023-08-24 NOTE — PLAN OF CARE
Problem: Depression  Goal: Will be euthymic at discharge  Description: INTERVENTIONS:  1. Administer medication as ordered  2. Provide emotional support via 1:1 interaction with staff  3. Encourage involvement in milieu/groups/activities  4. Monitor for social isolation  Problem: Drug Abuse/Detox  Goal: Will have no detox symptoms and will verbalize plan for changing drug-related behavior  Description: INTERVENTIONS:  1. Administer medication as ordered  2. Monitor physical status  3. Provide emotional support with 1:1 interaction with staff  4.  Encourage  recovery focused treatment   Outcome: Progressing

## 2023-08-25 VITALS
RESPIRATION RATE: 16 BRPM | OXYGEN SATURATION: 97 % | HEIGHT: 65 IN | HEART RATE: 80 BPM | BODY MASS INDEX: 26.08 KG/M2 | SYSTOLIC BLOOD PRESSURE: 110 MMHG | WEIGHT: 156.53 LBS | DIASTOLIC BLOOD PRESSURE: 70 MMHG | TEMPERATURE: 97.7 F

## 2023-08-25 PROCEDURE — 6370000000 HC RX 637 (ALT 250 FOR IP): Performed by: PSYCHIATRY & NEUROLOGY

## 2023-08-25 PROCEDURE — 6370000000 HC RX 637 (ALT 250 FOR IP): Performed by: NURSE PRACTITIONER

## 2023-08-25 PROCEDURE — 6370000000 HC RX 637 (ALT 250 FOR IP)

## 2023-08-25 RX ORDER — LURASIDONE HYDROCHLORIDE 20 MG/1
20 TABLET, FILM COATED ORAL
Qty: 30 TABLET | Refills: 0 | Status: SHIPPED | OUTPATIENT
Start: 2023-08-25

## 2023-08-25 RX ADMIN — CETIRIZINE HYDROCHLORIDE 10 MG: 10 TABLET, FILM COATED ORAL at 09:40

## 2023-08-25 RX ADMIN — Medication 1 MG: at 09:40

## 2023-08-25 RX ADMIN — THERA TABS 1 TABLET: TAB at 09:40

## 2023-08-25 RX ADMIN — Medication 100 MG: at 09:40

## 2023-08-25 RX ADMIN — PANTOPRAZOLE SODIUM 40 MG: 40 TABLET, DELAYED RELEASE ORAL at 06:19

## 2023-08-25 RX ADMIN — PHENOBARBITAL 16.2 MG: 32.4 TABLET ORAL at 09:41

## 2023-08-25 ASSESSMENT — PAIN - FUNCTIONAL ASSESSMENT: PAIN_FUNCTIONAL_ASSESSMENT: NONE - DENIES PAIN

## 2023-08-25 ASSESSMENT — PAIN SCALES - GENERAL: PAINLEVEL_OUTOF10: 0

## 2023-08-25 NOTE — BH NOTE
Behavioral Health Transition Record to Provider    Patient Name: Abner Monzon  YOB: 1970  Medical Record Number: [id number]  Date of Admission: 8/21/2023  Date of Discharge: 8/25/23    Attending Provider: Nick Mckeon MD  Discharging Provider: Malik Cote. NP   To contact this individual call [phone] and ask the  to page. If unavailable, ask to be transferred to [facility] Provider on call. [id number] Provider will be available on call 24/7 and during holidays. Primary Care Provider: Braden Terrazas MD    Allergies   Allergen Reactions    Bupropion Hives       Reason for Admission: This is a 47 y.o. female who is currently admitted to the general psychiatric floor at [facility] on a voluntary basis for worsening depression and etoh abuse secondary to grieving her father's death. She has been drinking about a fifth or more of vodka daily for the past 3+ years. She has started drinking in the mornings as well. She attempted to stop drinking cold turkey about 3 weeks ago, but due to severity of withdrawal symptoms, she couldn't function. She lasted 7 days without drinking. She endorses severe depression recently as well. She has been taking her lithium consistently. She denies current SI/plan/intent, denies HI/plan/intent, denies current AH/VH, but does endorse auditory hallucinations while intoxicated. She finally reached a point where she felt like her drinking was out of control after she wasn't able to stop on her own, which is what prompted her to seek care.      Admission Diagnosis: Alcohol abuse [F10.10]  History of bipolar disorder [Z86.59]  Severe episode of recurrent major depressive disorder, without psychotic features ([address] ) [F33.2]    * No surgery found *    Results for orders placed or performed during the hospital encounter of 08/21/23   COVID Only [facility])    Specimen: Nasopharyngeal   Result Value Ref Range    Source Nasopharyngeal Advance Directive, the patient was offered information on these advance directives Declined    Patient Instructions: Please continue all medications until otherwise directed by physician. Tobacco Cessation Discharge Plan:   Is the patient a smoker and needs referral for smoking cessation? Yes  Patient referred to the following for smoking cessation with an appointment? No     Patient was offered medication to assist with smoking cessation at discharge? No  Was education for smoking cessation added to the discharge instructions? Yes    Alcohol/Substance Abuse Discharge Plan:   Does the patient have a history of substance/alcohol abuse and requires a referral for treatment? Yes  Patient referred to the following for substance/alcohol abuse treatment with an appointment? Yes  Patient was offered medication to assist with alcohol cessation at discharge? Yes  Was education for substance/alcohol abuse added to discharge instructions?  Yes    Patient discharged to [facility]

## 2023-08-25 NOTE — BH NOTE
Interdisciplinary Rounds Note         Patient goal(s) for today: Communicate needs to staff   Treatment team focus/goals: Assess pt, manage medications, discharge planning   Progress note: DISCHARGE     LOS:  3 Expected LOS: 8/25/23    Financial concerns/prescription coverage:  no   Family contact:       Family requesting physician contact today:  No  Discharge plan: Pt will discharge home   Guns in the home: no         Outpatient provider(s): [facility];  [address] [place] [address]     Participating treatment team members: Donavon James, MARTHA Hernandez, Ethan Thorpe NP, MARTHA Bauer Intern

## 2023-08-25 NOTE — DISCHARGE SUMMARY
DISCHARGE SUMMARY  Some parts of the discharge summary are from the initial Psychiatric interview that was done on admission by the admitting psychiatrist.     Name: Chandrika Kim  MR#: [id number]  Account#: [de-identified]  : 1970  Date of Admission: 2023    Date of Discharge: 2023     TYPE OF DISCHARGE:   REGULAR     INITIAL PSYCHIATRIC INTERVIEW:   CHIEF COMPLAINT: \"My drinking is out of control. \"      HISTORY OF PRESENTING COMPLAINT:  This is a 47 y.o. female who is currently admitted to the general psychiatric floor at [facility] on a voluntary basis for worsening depression and etoh abuse secondary to grieving her father's death. She has been drinking about a fifth or more of vodka daily for the past 3+ years. She has started drinking in the mornings as well. She attempted to stop drinking cold turkey about 3 weeks ago, but due to severity of withdrawal symptoms, she couldn't function. She lasted 7 days without drinking. She endorses severe depression recently as well. She has been taking her lithium consistently. She denies current SI/plan/intent, denies HI/plan/intent, denies current AH/VH, but does endorse auditory hallucinations while intoxicated. She finally reached a point where she felt like her drinking was out of control after she wasn't able to stop on her own, which is what prompted her to seek care. PAST PSYCHIATRIC HISTORY: The pt has a hx of bipolar disorder and has been on lithium for 30+ years. She takes 600mg lithium QHS, though she has been prescribed 600mg BID. She reports taking a lower than prescribed dose because she is afraid of running out. She has never taken a higher dose than that. The pt has been taking Ambien, lithium, and Adderall. Her PCP, Dr. Chanda Worthy prescribes the lithium. No hx of suicide attempts or psychiatric hospitalizations. She has never done rehab or any substance use treatment.  She sees a therapist, Kiana Caballero with [facility], but does

## 2023-08-25 NOTE — PLAN OF CARE
Patient in bed with eyes closed and breathing even and unlabored. Bed in low position and wheels are locked. Room free of clutter and walk area clear. Non-skid socks present. Light dim to promote rest and provide light. Patient monitored every 15 minutes for safety.

## 2023-08-25 NOTE — BH NOTE
GROUP THERAPY PROGRESS NOTE    Patient did not participate in psychotherapy group.     Raquel THOMAS, [facility]

## 2023-08-25 NOTE — PROGRESS NOTES
Pharmacist Discharge Medication Reconciliation    Discharging Provider: Anup Bland NP    Significant PMH:   Past Medical History:   Diagnosis Date    ADD (attention deficit disorder)     Bladder incontinence     GERD (gastroesophageal reflux disease)     Psychiatric disorder     bipolar     Chief Complaint for this Admission:   Chief Complaint   Patient presents with    Mental Health Problem     Allergies: Bupropion    Discharge Medications:      Medication List        START taking these medications      lurasidone 20 MG Tabs tablet  Commonly known as: LATUDA  Take 1 tablet by mouth Daily with supper            CONTINUE taking these medications      cetirizine 10 MG tablet  Commonly known as: ZYRTEC     esomeprazole Magnesium 40 MG Pack  Commonly known as: NEXIUM     lithium 300 MG capsule     rOPINIRole 2 MG tablet  Commonly known as: REQUIP            STOP taking these medications      amphetamine-dextroamphetamine 20 MG tablet  Commonly known as: ADDERALL     lansoprazole 30 MG delayed release capsule  Commonly known as: PREVACID     metaxalone 800 MG tablet  Commonly known as: SKELAXIN     naloxone 4 MG/0.1ML Liqd nasal spray     zolpidem 10 MG tablet  Commonly known as: AMBIEN               Where to Get Your Medications        These medications were sent to [address] [phone]  [address] [place] [address]      Phone: [phone]   lurasidone 20 MG Tabs tablet       The patient's chart, MAR and AVS were reviewed by Claire Saunders [facility].

## 2023-08-25 NOTE — BH NOTE
Behavioral Health Interdisciplinary Rounds     Patient Name: Temo Hernandez  Age: 47 y.o.   Room/Bed:  [facility]  Primary Diagnosis: History of bipolar disorder   Admission Status: { Admission Status:47224}     Readmission within 30 days: {Yes/No:19414::\"no\"}  Power of  in place: {Yes/No:19414::\"no\"}  Patient requires a blocked bed: {Yes/No:19414::\"no\"}          Reason for blocked bed: ***  Sleep hours: ***       Participation in Care/Groups:  {Yes/No:19414::\"no\"}  Medication Compliant?: { Medication Compliant:48069}  PRNS (last 24 hours): { PRNS:01050}    Restraints (last 24 hours):  {Yes/No:19414::\"no\"}  __________________________________________________  OQ Admission Analysis Survey completed:  OQ Admission Analysis Survey score:  OQ Discharge Analysis Survey completed:  OQ Discharge Analysis Survey score:   __________________________________________________     Alcohol screening (AUDIT) completed -     If applicable, date SBIRT discussed in treatment team AND documented:    Tobacco - patient is a smoker:    Illegal Drugs use:      24 hour chart check complete: {Yes/No:19414::\"no\"}     _______________________________________________    Patient goal(s) for today:   Treatment team focus/goals:   Progress note:      Spiritual Care Consult:   Financial concerns/prescription coverage:    Family contact:                        Family requesting physician contact today:    Discharge plan:   Access to weapons :                                                              Outpatient provider(s):   Patient's preferred phone number for follow up call :   Patient's preferred e-mail address :    LOS:  3  Expected LOS:     Participating treatment team members: Temo Hernandez, * (assigned SW), ***

## 2024-02-20 ENCOUNTER — COMPREHENSIVE EXAM (OUTPATIENT)
Facility: LOCATION | Age: 54
End: 2024-02-20

## 2024-02-20 DIAGNOSIS — H52.13: ICD-10-CM

## 2024-02-20 DIAGNOSIS — H52.4: ICD-10-CM

## 2024-02-20 DIAGNOSIS — H52.221: ICD-10-CM

## 2024-02-20 DIAGNOSIS — Z46.0: ICD-10-CM

## 2024-02-20 PROCEDURE — 92025KAL KAL CORNEAL TOPOGRAPHY CL, ELECTIVE

## 2024-02-20 PROCEDURE — 92310CLE1 CONTACT LENS EVAL KAL

## 2024-02-20 PROCEDURE — 92015 DETERMINE REFRACTIVE STATE: CPT

## 2024-02-20 PROCEDURE — 92004 COMPRE OPH EXAM NEW PT 1/>: CPT

## 2024-02-20 ASSESSMENT — KERATOMETRY
OS_K1POWER_DIOPTERS: 42.50
OD_AXISANGLE2_DEGREES: 80
OS_AXISANGLE_DEGREES: 5
OD_AXISANGLE_DEGREES: 170
OS_AXISANGLE2_DEGREES: 95
OD_K1POWER_DIOPTERS: 44.50
OD_K2POWER_DIOPTERS: 45.25
OS_K2POWER_DIOPTERS: 43.75

## 2024-02-20 ASSESSMENT — VISUAL ACUITY
OD_CC: 20/30
OU_CC: 20/30
OD_CC: 20/30
OS_CC: 20/40-2
OD_CC: 20/40
OU_CC: 20/25-2
OU_CC: 20/30
OS_CC: 20/30
OS_CC: 20/30

## 2024-02-28 ENCOUNTER — CONTACT LENSES/GLASSES VISIT (OUTPATIENT)
Facility: LOCATION | Age: 54
End: 2024-02-28

## 2024-02-28 DIAGNOSIS — Z46.0: ICD-10-CM

## 2024-02-28 PROCEDURE — 92310-F CONTACT LENS FITTING FOLLOW UP

## 2024-02-28 ASSESSMENT — VISUAL ACUITY
OD_CC: 20/40
OU_CC: 20/20
OD_CC: 20/30
OS_CC: 20/20
OS_CC: CF 5FT
OU_CC: 20/30-2

## 2024-07-01 NOTE — ANESTHESIA POSTPROCEDURE EVALUATION
Last OV: 2024  Last Labs: 2021  Last Refills: 2023  Next Appt: None scheduled  Last [id number]  
Procedure(s):  RIGHT LUMBAR 5-SACRAL 1 MICRODISCECTOMY W/C-ARM.     general    Anesthesia Post Evaluation      Multimodal analgesia: multimodal analgesia used between 6 hours prior to anesthesia start to PACU discharge  Patient location during evaluation: PACU  Patient participation: complete - patient participated  Level of consciousness: awake and alert  Pain management: adequate  Airway patency: patent  Anesthetic complications: no  Cardiovascular status: acceptable  Respiratory status: acceptable  Hydration status: acceptable  Post anesthesia nausea and vomiting:  none  Final Post Anesthesia Temperature Assessment:  Normothermia (36.0-37.5 degrees C)      INITIAL Post-op Vital signs:   Vitals Value Taken Time   /72 07/28/21 1025   Temp 36.4 °C (97.5 °F) 07/28/21 0930   Pulse 64 07/28/21 1031   Resp 16 07/28/21 1031   SpO2 100 % 07/28/21 1031
03-Nov-2023 22:44

## (undated) DEVICE — FLOSEAL MATRIX IS INDICATED IN SURGICAL PROCEDURES (OTHER THAN IN OPHTHALMIC) AS AN ADJUNCT TO HEMOSTASIS WHEN CONTROL OF BLEEDING BY LIGATURE OR CONVENTIONAL PROCEDURES IS INEFFECTIVE OR IMPRACTICAL.: Brand: FLOSEAL HEMOSTATIC MATRIX

## (undated) DEVICE — CLIP APPLIER WITH CLIP LOGIC TECHNOLOGY: Brand: ENDO CLIP III

## (undated) DEVICE — (D)SYR 10ML 1/5ML GRAD NSAF -- PKGING CHANGE USE ITEM 338027

## (undated) DEVICE — SUTURE PERMAHAND SZ 2-0 L30IN NONABSORBABLE BLK SILK W/O A305H

## (undated) DEVICE — BLADELESS OPTICAL TROCAR WITH FIXATION CANNULA: Brand: VERSAPORT

## (undated) DEVICE — PAD,NON-ADHERENT,3X8,STERILE,LF,1/PK: Brand: MEDLINE

## (undated) DEVICE — BULB IRR SYR TY BASIN 50ML --

## (undated) DEVICE — HANDLE LT SNAP ON ULT DURABLE LENS FOR TRUMPF ALC DISPOSABLE

## (undated) DEVICE — SLIM BODY SKIN STAPLER: Brand: APPOSE ULC

## (undated) DEVICE — BIPOLAR SEALER 23-314-1 AQM MINI EVS 3.4: Brand: AQUAMANTYS™

## (undated) DEVICE — MEDI-VAC NON-CONDUCTIVE SUCTION TUBING: Brand: CARDINAL HEALTH

## (undated) DEVICE — 3M™ TEGADERM™ TRANSPARENT FILM DRESSING FRAME STYLE, 1626W, 4 IN X 4-3/4 IN (10 CM X 12 CM), 50/CT 4CT/CASE: Brand: 3M™ TEGADERM™

## (undated) DEVICE — POOLE SUCTION INSTRUMENT WITH REMOVABLE SHEATH: Brand: POOLE

## (undated) DEVICE — DEVON™ KNEE AND BODY STRAP 60" X 3" (1.5 M X 7.6 CM): Brand: DEVON

## (undated) DEVICE — DERMABOND SKIN ADH 0.7ML -- DERMABOND ADVANCED 12/BX

## (undated) DEVICE — CLIP LIG ADH PD W SLOT HORZ --

## (undated) DEVICE — (D)PREP SKN CHLRAPRP APPL 26ML -- CONVERT TO ITEM 371833

## (undated) DEVICE — WATERPROOF, BACTERIA PROOF DRESSING WITH ABSORBENT SEE THROUGH PAD: Brand: OPSITE POST-OP VISIBLE 30X10CM CTN 20

## (undated) DEVICE — KENDALL SCD EXPRESS SLEEVES, KNEE LENGTH, MEDIUM: Brand: KENDALL SCD

## (undated) DEVICE — SUT SLK 2-0SH 30IN BLK --

## (undated) DEVICE — ZINACTIVE USE 2641837 CLIP LIG M BLU TI HRT SHP WIRE HORZ 600 PER BX

## (undated) DEVICE — UNIVERSAL FIXATION CANNULA: Brand: VERSAONE

## (undated) DEVICE — SUTURE MCRYL SZ 5 0 L18IN ABSRB UD PC 5 L19MM 1 2 CIR Y822G

## (undated) DEVICE — Device

## (undated) DEVICE — PACK PROCEDURE SURG LAMINECTOMY SPINE CUST

## (undated) DEVICE — 1200 GUARD II KIT W/5MM TUBE W/O VAC TUBE: Brand: GUARDIAN

## (undated) DEVICE — WATERPROOF, BACTERIA PROOF DRESSING WITH ABSORBENT SEE THROUGH PAD: Brand: OPSITE POST-OP VISIBLE 15X10CM CTN 20

## (undated) DEVICE — STERILE POLYISOPRENE POWDER-FREE SURGICAL GLOVES WITH EMOLLIENT COATING: Brand: PROTEXIS

## (undated) DEVICE — NDL SPNE QNCKE 18GX3.5IN LF --

## (undated) DEVICE — INSULATED BLADE ELECTRODE;CAUTION: FOR MANUFACTURING, PROCESSING, OR REPACKING.: Brand: EDGE

## (undated) DEVICE — SOLUTION IV 1000ML 0.9% SOD CHL

## (undated) DEVICE — SUTURE PROL SZ 3-0 L18IN NONABSORBABLE BLU L19MM PC-5 3/8 8632G

## (undated) DEVICE — GOWN,PREVENTION PLUS,XL,ST,24/CS: Brand: MEDLINE

## (undated) DEVICE — SUT VCRL + 0 36IN UR6 VIO --

## (undated) DEVICE — SUTURE VCRL + SZ 2-0 L18IN ABSRB VLT CT-2 1/2 CIR TAPERCUT VCP726D

## (undated) DEVICE — SOLUTION IRRIGATION NACL 0.9% 1000 ML FLX CONTAINER

## (undated) DEVICE — BLUNT TROCAR WITH THREADED ANCHOR: Brand: VERSAONE

## (undated) DEVICE — SOL IRRIGATION INJ NACL 0.9% 500ML BTL

## (undated) DEVICE — ELECTRODE ES 36CM LAP FLAT L HK COAT DISP CLEANCOAT

## (undated) DEVICE — STERILE POLYISOPRENE POWDER-FREE SURGICAL GLOVES: Brand: PROTEXIS

## (undated) DEVICE — Z DISCONTINUED USE (MFG CAT 7984-37) SOLUTION IV SODIUM CHL 0.9% 100 ML INJ

## (undated) DEVICE — SUT PROL 5-0 36IN RB1 DA BLU --

## (undated) DEVICE — MARYLAND JAW OPEN SEALER/DIVIDER: Brand: LIGASURE

## (undated) DEVICE — SUTURE PDS II SZ 1 L96IN ABSRB VLT XLH L70MM 1/2 CIR Z881G

## (undated) DEVICE — DERMABOND SKIN ADH 0.7ML --

## (undated) DEVICE — INFECTION CONTROL KIT SYS

## (undated) DEVICE — SPONGE HEMOSTAT CELLULS 4X8IN -- SURGICEL

## (undated) DEVICE — SURGICAL PROCEDURE KIT GEN LAPAROSCOPY LF

## (undated) DEVICE — SEALANT FIBRIN 10 CC FRZN PRE FILLED SYR TISSEEL

## (undated) DEVICE — SEALANT HEMOSTAT W/THROM 8ML -- SURGIFLO MATRIX

## (undated) DEVICE — NEEDLE HYPO 25GA L1.5IN BVL ORIENTED ECLIPSE

## (undated) DEVICE — REM POLYHESIVE ADULT PATIENT RETURN ELECTRODE: Brand: VALLEYLAB

## (undated) DEVICE — SPECIMEN RETRIEVAL POUCH: Brand: ENDO CATCH GOLD

## (undated) DEVICE — PART NUMBER 108260, VTI 8 MHZ DISPOSABLE DOPPLER PROBE, STRAIGHT, BOX: Brand: VTI 8 MHZ DISPOSABLE DOPPLER PROBE, STRAIGHT, BOX

## (undated) DEVICE — SUTURE PERMAHAND SZ 3-0 L30IN NONABSORBABLE BLK SILK BRAID A304H

## (undated) DEVICE — SUTURE SZ 0 27IN 5/8 CIR UR-6  TAPER PT VIOLET ABSRB VICRYL J603H

## (undated) DEVICE — VESSEL LOOPS,MINI, YELLOW: Brand: DEVON

## (undated) DEVICE — VESSEL LOOPS,MAXI, BLUE: Brand: DEVON

## (undated) DEVICE — YANKAUER BULB TIP, NO VENT: Brand: ARGYLE

## (undated) DEVICE — SPONGE LAP 18X18IN STRL -- 5/PK

## (undated) DEVICE — INTENDED FOR TISSUE SEPARATION, AND OTHER PROCEDURES THAT REQUIRE A SHARP SURGICAL BLADE TO PUNCTURE OR CUT.: Brand: BARD-PARKER ® CARBON RIB-BACK BLADES

## (undated) DEVICE — APPLICATOR TISS 20 GAX32 CM W/ SNAP LCK SS DUPLOTIP DISP